# Patient Record
Sex: MALE | Race: OTHER
[De-identification: names, ages, dates, MRNs, and addresses within clinical notes are randomized per-mention and may not be internally consistent; named-entity substitution may affect disease eponyms.]

---

## 2018-08-16 ENCOUNTER — APPOINTMENT (OUTPATIENT)
Dept: FAMILY MEDICINE | Facility: CLINIC | Age: 28
End: 2018-08-16
Payer: COMMERCIAL

## 2018-08-16 VITALS
WEIGHT: 138 LBS | DIASTOLIC BLOOD PRESSURE: 80 MMHG | OXYGEN SATURATION: 97 % | BODY MASS INDEX: 18.69 KG/M2 | HEART RATE: 93 BPM | TEMPERATURE: 98.6 F | SYSTOLIC BLOOD PRESSURE: 110 MMHG | HEIGHT: 72 IN

## 2018-08-16 DIAGNOSIS — R68.89 OTHER GENERAL SYMPTOMS AND SIGNS: ICD-10-CM

## 2018-08-16 DIAGNOSIS — Z00.00 ENCOUNTER FOR GENERAL ADULT MEDICAL EXAMINATION W/OUT ABNORMAL FINDINGS: ICD-10-CM

## 2018-08-16 PROCEDURE — 99203 OFFICE O/P NEW LOW 30 MIN: CPT | Mod: Q5

## 2018-08-16 NOTE — HISTORY OF PRESENT ILLNESS
[FreeTextEntry8] : c/o cough, pain to rt side of trunk, headache, fever, vomiting, no diarrhea,after being out in the rain for an hr this past sunday\par today feels somewhat better\par denies recent travel\par kids are sick

## 2018-08-16 NOTE — PHYSICAL EXAM

## 2018-09-02 ENCOUNTER — APPOINTMENT (OUTPATIENT)
Dept: FAMILY MEDICINE | Facility: CLINIC | Age: 28
End: 2018-09-02
Payer: COMMERCIAL

## 2018-09-02 VITALS
BODY MASS INDEX: 18.69 KG/M2 | SYSTOLIC BLOOD PRESSURE: 108 MMHG | DIASTOLIC BLOOD PRESSURE: 70 MMHG | WEIGHT: 138 LBS | HEIGHT: 72 IN | TEMPERATURE: 98 F

## 2018-09-02 PROCEDURE — 99213 OFFICE O/P EST LOW 20 MIN: CPT

## 2018-09-13 ENCOUNTER — APPOINTMENT (OUTPATIENT)
Dept: FAMILY MEDICINE | Facility: CLINIC | Age: 28
End: 2018-09-13
Payer: COMMERCIAL

## 2018-09-13 VITALS
HEIGHT: 72 IN | WEIGHT: 135 LBS | DIASTOLIC BLOOD PRESSURE: 60 MMHG | BODY MASS INDEX: 18.28 KG/M2 | SYSTOLIC BLOOD PRESSURE: 90 MMHG

## 2018-09-13 DIAGNOSIS — B34.9 VIRAL INFECTION, UNSPECIFIED: ICD-10-CM

## 2018-09-13 PROCEDURE — 36415 COLL VENOUS BLD VENIPUNCTURE: CPT

## 2018-09-13 PROCEDURE — 99213 OFFICE O/P EST LOW 20 MIN: CPT | Mod: 25

## 2018-09-29 ENCOUNTER — TRANSCRIPTION ENCOUNTER (OUTPATIENT)
Age: 28
End: 2018-09-29

## 2018-10-01 ENCOUNTER — APPOINTMENT (OUTPATIENT)
Dept: FAMILY MEDICINE | Facility: CLINIC | Age: 28
End: 2018-10-01
Payer: COMMERCIAL

## 2018-10-01 VITALS
HEART RATE: 95 BPM | DIASTOLIC BLOOD PRESSURE: 74 MMHG | RESPIRATION RATE: 16 BRPM | HEIGHT: 72 IN | OXYGEN SATURATION: 95 % | SYSTOLIC BLOOD PRESSURE: 108 MMHG | WEIGHT: 135 LBS | BODY MASS INDEX: 18.28 KG/M2

## 2018-10-01 DIAGNOSIS — M79.662 PAIN IN LEFT LOWER LEG: ICD-10-CM

## 2018-10-01 PROCEDURE — 99213 OFFICE O/P EST LOW 20 MIN: CPT | Mod: 25

## 2018-10-01 NOTE — ASSESSMENT
[FreeTextEntry1] : reassurance given\par reviewed doppler - negative\par cannot take nsaids and ibuprofen\par rest, ice, elevation\par \par refer to ortho\par possible MRI

## 2018-10-01 NOTE — HISTORY OF PRESENT ILLNESS
[FreeTextEntry8] : 28 year old male here with complaints of left calf pain three days ago.  moved fast and hurt it.  went to urgent care, had doppler which was negative. Patients active medications, allergies and issues were all reviewed with the patient at time of visit.\par

## 2018-10-01 NOTE — PHYSICAL EXAM
[Well Nourished] : well nourished [Normal Oropharynx] : the oropharynx was normal [Supple] : supple [Soft] : abdomen soft [No Rash] : no rash [de-identified] : left calf tenderness - no redness, no erythema

## 2018-10-08 ENCOUNTER — APPOINTMENT (OUTPATIENT)
Dept: GASTROENTEROLOGY | Facility: CLINIC | Age: 28
End: 2018-10-08
Payer: COMMERCIAL

## 2018-10-08 VITALS
DIASTOLIC BLOOD PRESSURE: 72 MMHG | SYSTOLIC BLOOD PRESSURE: 106 MMHG | WEIGHT: 133.8 LBS | HEIGHT: 72 IN | BODY MASS INDEX: 18.12 KG/M2

## 2018-10-08 DIAGNOSIS — E88.09 OTHER DISORDERS OF PLASMA-PROTEIN METABOLISM, NOT ELSEWHERE CLASSIFIED: ICD-10-CM

## 2018-10-08 DIAGNOSIS — D64.9 ANEMIA, UNSPECIFIED: ICD-10-CM

## 2018-10-08 DIAGNOSIS — R68.81 EARLY SATIETY: ICD-10-CM

## 2018-10-08 DIAGNOSIS — R05 COUGH: ICD-10-CM

## 2018-10-08 DIAGNOSIS — K21.9 GASTRO-ESOPHAGEAL REFLUX DISEASE W/OUT ESOPHAGITIS: ICD-10-CM

## 2018-10-08 DIAGNOSIS — Z78.9 OTHER SPECIFIED HEALTH STATUS: ICD-10-CM

## 2018-10-08 DIAGNOSIS — Z80.3 FAMILY HISTORY OF MALIGNANT NEOPLASM OF BREAST: ICD-10-CM

## 2018-10-08 PROCEDURE — 36415 COLL VENOUS BLD VENIPUNCTURE: CPT

## 2018-10-08 PROCEDURE — 82274 ASSAY TEST FOR BLOOD FECAL: CPT | Mod: QW

## 2018-10-08 PROCEDURE — 99205 OFFICE O/P NEW HI 60 MIN: CPT | Mod: 25

## 2018-10-08 RX ORDER — IBUPROFEN 600 MG/1
600 TABLET, FILM COATED ORAL
Qty: 20 | Refills: 0 | Status: DISCONTINUED | COMMUNITY
Start: 2018-09-29 | End: 2018-10-08

## 2018-10-09 ENCOUNTER — FORM ENCOUNTER (OUTPATIENT)
Age: 28
End: 2018-10-09

## 2018-10-09 LAB
ALBUMIN MFR SERPL ELPH: 36.9 %
ALBUMIN SERPL-MCNC: 2.8 G/DL
ALBUMIN/GLOB SERPL: 0.6 RATIO
ALPHA1 GLOB MFR SERPL ELPH: 8.2 %
ALPHA1 GLOB SERPL ELPH-MCNC: 0.6 G/DL
ALPHA2 GLOB MFR SERPL ELPH: 14.6 %
ALPHA2 GLOB SERPL ELPH-MCNC: 1.1 G/DL
AMYLASE/CREAT SERPL: 48 U/L
B-GLOBULIN MFR SERPL ELPH: 12.3 %
B-GLOBULIN SERPL ELPH-MCNC: 0.9 G/DL
BASOPHILS # BLD AUTO: 0.04 K/UL
BASOPHILS NFR BLD AUTO: 0.3 %
CRP SERPL-MCNC: 9.21 MG/DL
DEPRECATED KAPPA LC FREE/LAMBDA SER: 1.39 RATIO
EOSINOPHIL # BLD AUTO: 0.15 K/UL
EOSINOPHIL NFR BLD AUTO: 1.2 %
ERYTHROCYTE [SEDIMENTATION RATE] IN BLOOD BY WESTERGREN METHOD: 53 MM/HR
FERRITIN SERPL-MCNC: 444 NG/ML
GAMMA GLOB FLD ELPH-MCNC: 2.2 G/DL
GAMMA GLOB MFR SERPL ELPH: 28 %
HCT VFR BLD CALC: 32.8 %
HGB BLD-MCNC: 10.1 G/DL
IGA SER QL IEP: 429 MG/DL
IGG SER QL IEP: 2113 MG/DL
IGM SER QL IEP: 123 MG/DL
IMM GRANULOCYTES NFR BLD AUTO: 0.2 %
INTERPRETATION SERPL IEP-IMP: NORMAL
IRON SATN MFR SERPL: 7 %
IRON SERPL-MCNC: 12 UG/DL
KAPPA LC CSF-MCNC: 5.75 MG/DL
KAPPA LC SERPL-MCNC: 8 MG/DL
LPL SERPL-CCNC: 19 U/L
LYMPHOCYTES # BLD AUTO: 2.34 K/UL
LYMPHOCYTES NFR BLD AUTO: 18.3 %
M PROTEIN MFR SERPL ELPH: 4.9 %
M PROTEIN SPEC IFE-MCNC: NORMAL
MAN DIFF?: NORMAL
MCHC RBC-ENTMCNC: 24.5 PG
MCHC RBC-ENTMCNC: 30.8 GM/DL
MCV RBC AUTO: 79.6 FL
MONOCLON BAND OBS SERPL: 0.4 G/DL
MONOCYTES # BLD AUTO: 1.18 K/UL
MONOCYTES NFR BLD AUTO: 9.2 %
NEUTROPHILS # BLD AUTO: 9.08 K/UL
NEUTROPHILS NFR BLD AUTO: 70.8 %
PLATELET # BLD AUTO: 293 K/UL
PROT SERPL-MCNC: 7.7 G/DL
PROT SERPL-MCNC: 7.7 G/DL
RBC # BLD: 4.12 M/UL
RBC # BLD: 4.12 M/UL
RBC # FLD: 13.4 %
RETICS # AUTO: 1 %
RETICS AGGREG/RBC NFR: 42 K/UL
TIBC SERPL-MCNC: 184 UG/DL
UIBC SERPL-MCNC: 172 UG/DL
WBC # FLD AUTO: 12.82 K/UL

## 2018-10-10 ENCOUNTER — OUTPATIENT (OUTPATIENT)
Dept: OUTPATIENT SERVICES | Facility: HOSPITAL | Age: 28
LOS: 1 days | End: 2018-10-10
Payer: COMMERCIAL

## 2018-10-10 ENCOUNTER — INPATIENT (INPATIENT)
Facility: HOSPITAL | Age: 28
LOS: 28 days | Discharge: HOME | End: 2018-11-08
Attending: HOSPITALIST | Admitting: HOSPITALIST
Payer: COMMERCIAL

## 2018-10-10 ENCOUNTER — APPOINTMENT (OUTPATIENT)
Dept: RADIOLOGY | Facility: IMAGING CENTER | Age: 28
End: 2018-10-10
Payer: COMMERCIAL

## 2018-10-10 VITALS
TEMPERATURE: 98 F | SYSTOLIC BLOOD PRESSURE: 120 MMHG | HEART RATE: 86 BPM | OXYGEN SATURATION: 100 % | RESPIRATION RATE: 16 BRPM | DIASTOLIC BLOOD PRESSURE: 67 MMHG

## 2018-10-10 DIAGNOSIS — R05 COUGH: ICD-10-CM

## 2018-10-10 DIAGNOSIS — Z00.00 ENCOUNTER FOR GENERAL ADULT MEDICAL EXAMINATION WITHOUT ABNORMAL FINDINGS: ICD-10-CM

## 2018-10-10 DIAGNOSIS — D64.9 ANEMIA, UNSPECIFIED: ICD-10-CM

## 2018-10-10 DIAGNOSIS — Z00.8 ENCOUNTER FOR OTHER GENERAL EXAMINATION: ICD-10-CM

## 2018-10-10 DIAGNOSIS — E87.1 HYPO-OSMOLALITY AND HYPONATREMIA: ICD-10-CM

## 2018-10-10 DIAGNOSIS — E88.09 OTHER DISORDERS OF PLASMA-PROTEIN METABOLISM, NOT ELSEWHERE CLASSIFIED: ICD-10-CM

## 2018-10-10 DIAGNOSIS — T88.3XXA MALIGNANT HYPERTHERMIA DUE TO ANESTHESIA, INITIAL ENCOUNTER: ICD-10-CM

## 2018-10-10 LAB
ALBUMIN SERPL ELPH-MCNC: 3.1 G/DL — LOW (ref 3.3–5)
ALP SERPL-CCNC: 68 U/L — SIGNIFICANT CHANGE UP (ref 40–120)
ALT FLD-CCNC: 12 U/L — SIGNIFICANT CHANGE UP (ref 4–41)
APTT BLD: 29.3 SEC — SIGNIFICANT CHANGE UP (ref 27.5–37.4)
AST SERPL-CCNC: 27 U/L — SIGNIFICANT CHANGE UP (ref 4–40)
B PERT DNA SPEC QL NAA+PROBE: SIGNIFICANT CHANGE UP
BASOPHILS # BLD AUTO: 0.05 K/UL — SIGNIFICANT CHANGE UP (ref 0–0.2)
BASOPHILS NFR BLD AUTO: 0.4 % — SIGNIFICANT CHANGE UP (ref 0–2)
BILIRUB SERPL-MCNC: 0.4 MG/DL — SIGNIFICANT CHANGE UP (ref 0.2–1.2)
BUN SERPL-MCNC: 14 MG/DL — SIGNIFICANT CHANGE UP (ref 7–23)
C PNEUM DNA SPEC QL NAA+PROBE: NOT DETECTED — SIGNIFICANT CHANGE UP
CALCIUM SERPL-MCNC: 8.1 MG/DL — LOW (ref 8.4–10.5)
CHLORIDE SERPL-SCNC: 97 MMOL/L — LOW (ref 98–107)
CO2 SERPL-SCNC: 21 MMOL/L — LOW (ref 22–31)
CREAT SERPL-MCNC: 1.1 MG/DL — SIGNIFICANT CHANGE UP (ref 0.5–1.3)
EOSINOPHIL # BLD AUTO: 0.1 K/UL — SIGNIFICANT CHANGE UP (ref 0–0.5)
EOSINOPHIL NFR BLD AUTO: 0.8 % — SIGNIFICANT CHANGE UP (ref 0–6)
FLUAV H1 2009 PAND RNA SPEC QL NAA+PROBE: NOT DETECTED — SIGNIFICANT CHANGE UP
FLUAV H1 RNA SPEC QL NAA+PROBE: NOT DETECTED — SIGNIFICANT CHANGE UP
FLUAV H3 RNA SPEC QL NAA+PROBE: NOT DETECTED — SIGNIFICANT CHANGE UP
FLUAV SUBTYP SPEC NAA+PROBE: SIGNIFICANT CHANGE UP
FLUBV RNA SPEC QL NAA+PROBE: NOT DETECTED — SIGNIFICANT CHANGE UP
GLUCOSE SERPL-MCNC: 84 MG/DL — SIGNIFICANT CHANGE UP (ref 70–99)
HADV DNA SPEC QL NAA+PROBE: NOT DETECTED — SIGNIFICANT CHANGE UP
HCOV 229E RNA SPEC QL NAA+PROBE: NOT DETECTED — SIGNIFICANT CHANGE UP
HCOV HKU1 RNA SPEC QL NAA+PROBE: NOT DETECTED — SIGNIFICANT CHANGE UP
HCOV NL63 RNA SPEC QL NAA+PROBE: NOT DETECTED — SIGNIFICANT CHANGE UP
HCOV OC43 RNA SPEC QL NAA+PROBE: NOT DETECTED — SIGNIFICANT CHANGE UP
HCT VFR BLD CALC: 31.3 % — LOW (ref 39–50)
HGB BLD-MCNC: 9.8 G/DL — LOW (ref 13–17)
HIV COMBO RESULT: SIGNIFICANT CHANGE UP
HIV1+2 AB SPEC QL: SIGNIFICANT CHANGE UP
HMPV RNA SPEC QL NAA+PROBE: NOT DETECTED — SIGNIFICANT CHANGE UP
HPIV1 RNA SPEC QL NAA+PROBE: NOT DETECTED — SIGNIFICANT CHANGE UP
HPIV2 RNA SPEC QL NAA+PROBE: NOT DETECTED — SIGNIFICANT CHANGE UP
HPIV3 RNA SPEC QL NAA+PROBE: NOT DETECTED — SIGNIFICANT CHANGE UP
HPIV4 RNA SPEC QL NAA+PROBE: NOT DETECTED — SIGNIFICANT CHANGE UP
IMM GRANULOCYTES # BLD AUTO: 0.06 # — SIGNIFICANT CHANGE UP
IMM GRANULOCYTES NFR BLD AUTO: 0.5 % — SIGNIFICANT CHANGE UP (ref 0–1.5)
INR BLD: 1.38 — HIGH (ref 0.88–1.17)
LYMPHOCYTES # BLD AUTO: 19.1 % — SIGNIFICANT CHANGE UP (ref 13–44)
LYMPHOCYTES # BLD AUTO: 2.38 K/UL — SIGNIFICANT CHANGE UP (ref 1–3.3)
M PNEUMO DNA SPEC QL NAA+PROBE: NOT DETECTED — SIGNIFICANT CHANGE UP
MCHC RBC-ENTMCNC: 24.4 PG — LOW (ref 27–34)
MCHC RBC-ENTMCNC: 31.3 % — LOW (ref 32–36)
MCV RBC AUTO: 78.1 FL — LOW (ref 80–100)
MONOCYTES # BLD AUTO: 1.3 K/UL — HIGH (ref 0–0.9)
MONOCYTES NFR BLD AUTO: 10.5 % — SIGNIFICANT CHANGE UP (ref 2–14)
NEUTROPHILS # BLD AUTO: 8.55 K/UL — HIGH (ref 1.8–7.4)
NEUTROPHILS NFR BLD AUTO: 68.7 % — SIGNIFICANT CHANGE UP (ref 43–77)
NRBC # FLD: 0 — SIGNIFICANT CHANGE UP
PLATELET # BLD AUTO: 265 K/UL — SIGNIFICANT CHANGE UP (ref 150–400)
PMV BLD: 10.8 FL — SIGNIFICANT CHANGE UP (ref 7–13)
POTASSIUM SERPL-MCNC: 5 MMOL/L — SIGNIFICANT CHANGE UP (ref 3.5–5.3)
POTASSIUM SERPL-SCNC: 5 MMOL/L — SIGNIFICANT CHANGE UP (ref 3.5–5.3)
PROT SERPL-MCNC: 7.9 G/DL — SIGNIFICANT CHANGE UP (ref 6–8.3)
PROTHROM AB SERPL-ACNC: 15.4 SEC — HIGH (ref 9.8–13.1)
RBC # BLD: 4.01 M/UL — LOW (ref 4.2–5.8)
RBC # FLD: 12.7 % — SIGNIFICANT CHANGE UP (ref 10.3–14.5)
RSV RNA SPEC QL NAA+PROBE: NOT DETECTED — SIGNIFICANT CHANGE UP
RV+EV RNA SPEC QL NAA+PROBE: NOT DETECTED — SIGNIFICANT CHANGE UP
SODIUM SERPL-SCNC: 133 MMOL/L — LOW (ref 135–145)
WBC # BLD: 12.44 K/UL — HIGH (ref 3.8–10.5)
WBC # FLD AUTO: 12.44 K/UL — HIGH (ref 3.8–10.5)

## 2018-10-10 PROCEDURE — 99223 1ST HOSP IP/OBS HIGH 75: CPT | Mod: GC

## 2018-10-10 PROCEDURE — 71046 X-RAY EXAM CHEST 2 VIEWS: CPT

## 2018-10-10 PROCEDURE — 71046 X-RAY EXAM CHEST 2 VIEWS: CPT | Mod: 26

## 2018-10-10 RX ORDER — AZITHROMYCIN 500 MG/1
500 TABLET, FILM COATED ORAL ONCE
Qty: 0 | Refills: 0 | Status: COMPLETED | OUTPATIENT
Start: 2018-10-10 | End: 2018-10-10

## 2018-10-10 RX ORDER — AZITHROMYCIN 500 MG/1
500 TABLET, FILM COATED ORAL ONCE
Qty: 0 | Refills: 0 | Status: DISCONTINUED | OUTPATIENT
Start: 2018-10-10 | End: 2018-10-10

## 2018-10-10 RX ORDER — PANTOPRAZOLE SODIUM 20 MG/1
40 TABLET, DELAYED RELEASE ORAL
Qty: 0 | Refills: 0 | Status: DISCONTINUED | OUTPATIENT
Start: 2018-10-10 | End: 2018-10-21

## 2018-10-10 RX ORDER — AZITHROMYCIN 500 MG/1
500 TABLET, FILM COATED ORAL EVERY 24 HOURS
Qty: 0 | Refills: 0 | Status: DISCONTINUED | OUTPATIENT
Start: 2018-10-11 | End: 2018-10-12

## 2018-10-10 RX ORDER — CEFTRIAXONE 500 MG/1
1 INJECTION, POWDER, FOR SOLUTION INTRAMUSCULAR; INTRAVENOUS ONCE
Qty: 0 | Refills: 0 | Status: COMPLETED | OUTPATIENT
Start: 2018-10-10 | End: 2018-10-10

## 2018-10-10 RX ORDER — CEFTRIAXONE 500 MG/1
1 INJECTION, POWDER, FOR SOLUTION INTRAMUSCULAR; INTRAVENOUS EVERY 24 HOURS
Qty: 0 | Refills: 0 | Status: DISCONTINUED | OUTPATIENT
Start: 2018-10-11 | End: 2018-10-12

## 2018-10-10 RX ADMIN — CEFTRIAXONE 100 GRAM(S): 500 INJECTION, POWDER, FOR SOLUTION INTRAMUSCULAR; INTRAVENOUS at 23:57

## 2018-10-10 RX ADMIN — CEFTRIAXONE 100 GRAM(S): 500 INJECTION, POWDER, FOR SOLUTION INTRAMUSCULAR; INTRAVENOUS at 14:03

## 2018-10-10 RX ADMIN — AZITHROMYCIN 250 MILLIGRAM(S): 500 TABLET, FILM COATED ORAL at 14:24

## 2018-10-10 NOTE — ED PROVIDER NOTE - OBJECTIVE STATEMENT
28 M presenting due to abnormal chest xray done by GI doctor in the setting of 6  mo dry cough and 2 mo of fever (estimates 1 hour per week, head feels "warm" during that time and oral temp measured at home is 101. Has prior PPD +ve, and got the BCG vaccine as a child in [place] he believes. Immig[place] to [place] at 12 y/o. The 2 mo of coughing occasionally lead him to vomit, never has been bloody. Is a non smoker. No other PMHx other than he started taking omeprazole recently for the coughing which he was told might be due to reflux.  Denies hemoptysis, n/v/d, skin changes, HA, vision changes, recent int'l travel, sick contacts, ever being incarcerated or homeless, no IVDU

## 2018-10-10 NOTE — ED ADULT NURSE NOTE - CHPI ED NUR SYMPTOMS NEG
no shortness of breath/no chest pain/no chills/no diaphoresis/no edema/no fever/no headache/no body aches/no hemoptysis/no wheezing

## 2018-10-10 NOTE — ED ADULT TRIAGE NOTE - CHIEF COMPLAINT QUOTE
arrives with N95 mask in place A&Ox3 arrives from [address] [place] [address] was sent by imaging office chest x-ray showed possible TB  pt reports cough for past six months with fever chills denies cp sob n/v/d, neck stiffness, respirations equal and non labored no respiratory distress noted

## 2018-10-10 NOTE — H&P ADULT - ASSESSMENT
28 year old Turkish male with PMHx of GERD presenting to ED after X-ray performed by GI for chronic cough demonstrated multifocal consolidations, with admission for rule out of TB.

## 2018-10-10 NOTE — ED PROVIDER NOTE - ATTENDING CONTRIBUTION TO CARE
28M sent to ED after outpatient CXR shows signs of TB. Patient with positive PPD in past. Endorses fever, cough, chills over past several months. Patient denies headache, vision changes, focal weakness/numbness, neck pain, chest pain, back pain, abd pain, nausea, vomiting, diarrhea, constipation, blood in stool, dysuria, rash, trauma, falls. Patient is well appearing, conversant, cooperative, smiling, head atraumatic, neck supple with full range of motion, oropharynx clear, lungs clear, no crackles or rales, speaking full sentences, heart clear, no murmurs, distal pulses equal in all 4 extremities, abdomen soft nontender nondistended with no masses, no leg edema, no calf tenderness, nonfocal neurologic exam.    Patient stable. No signs of sepsis or shock. Antibiotics for CAP. TB labs. Respiratory isolation. Cultures. Admit.

## 2018-10-10 NOTE — H&P ADULT - PROBLEM SELECTOR PLAN 2
-Microcytic anemia  -Hgb 9.8 (unclear of baseline)  -no active signs of bleeding; as per patient, occasional small amount of BRB in stool (FOBT as outpatient was negative, never had colonoscopy)    *will send iron studies  *will send for hemolysis labs (LDH, haptoglobin)  *monitor Hgb and for signs of bleeding

## 2018-10-10 NOTE — ED ADULT NURSE NOTE - CHIEF COMPLAINT QUOTE
arrives with N95 mask in place A&Ox3 arrives from [address] [place] [address] was sent by imaging office chest x-ray showed possible TB  pt reports cough for past six months with fever chills denies cp sob n/v/d, neck stiffness, respirations equal and non labored no respiratory distress noted

## 2018-10-10 NOTE — H&P ADULT - NSHPSOCIALHISTORY_GEN_ALL_CORE
Lives at home with significant other, mother, 2 children (ages 2,3), sister  Denies tobacco or illicit drug use, occasional EtOH use

## 2018-10-10 NOTE — H&P ADULT - ATTENDING COMMENTS
Pt seen and examined with HS on above date.  Agree with above HS note.  Plan discussed with House staff.    28 M with ? GERD sent from GI with multifocal pneumonia (CXR read by me).  some concern for TB, seems less likely.  Will cover for CAP, chest CT. Check iron studies given microcytic anemia and poor nutritional status. coagulopathy with unclear etiology, possible nutritional deficiency too. Pt seen and examined at bedside on 10/10 at 6 pm.  Pt seen and examined with HS.  Agree with above HS note.  Plan discussed with House staff.    28 M with ? GERD sent from GI with multifocal pneumonia (CXR read by me).  some concern for TB, seems less likely.  Will cover for CAP, chest CT. Check iron studies given microcytic anemia and poor nutritional status. coagulopathy with unclear etiology, possible nutritional deficiency too.

## 2018-10-10 NOTE — ED ADULT NURSE NOTE - OBJECTIVE STATEMENT
Patient reports flu like symptoms, saw PMD and had XR performed. Patient states that MD was concerned for TB d/t + PPD tests in the past. Patient reports cough, intermittent fevers, denies any CP or difficulty breathing or hemoptysis. Patient appears NAD, VSS. Patient is able to converse without difficulty. Patient pending further evaluation by MD. Patient reports flu like symptoms, saw GI specialist and had XR performed. Patient states that MD was concerned for TB d/t + PPD tests in the past. Patient went to PMD d/t recurring heartburn, unrelieved by medication. Patient reports cough, intermittent fevers, denies any CP or difficulty breathing or hemoptysis. Patient denies any recent weight loss, hx of HIV/drug use, incarceration or being homeless. Patient appears NAD, VSS. Patient is able to converse without difficulty. Patient currently refusing to have rectal temperature taken. Patient pending further evaluation by MD.

## 2018-10-10 NOTE — H&P ADULT - NSHPLABSRESULTS_GEN_ALL_CORE
.  LABS:                         9.8    12.44 )-----------( 265      ( 10 Oct 2018 13:40 )             31.3     10-10    133<L>  |  97<L>  |  14  ----------------------------<  84  5.0   |  21<L>  |  1.10    Ca    8.1<L>      10 Oct 2018 13:40    TPro  7.9  /  Alb  3.1<L>  /  TBili  0.4  /  DBili  x   /  AST  27  /  ALT  12  /  AlkPhos  68  10-10    PT/INR - ( 10 Oct 2018 13:40 )   PT: 15.4 SEC;   INR: 1.38          PTT - ( 10 Oct 2018 13:40 )  PTT:29.3 SEC          RADIOLOGY, EKG & ADDITIONAL TESTS: Reviewed.     < from: Xray Chest 2 Views PA/Lat (10.10.18 @ 11:42) >    INTERPRETATION:  History: Fever, night sweats and cough times several   months    PA and lateral chest    Heart size is normal    Multifocal consolidations are noted, including in the right lung apex.    No acute bony abnormality      Impression:    Multifocal consolidations, likely infectious. Given history, tuberculosis   should be considered. The patient  was sent to the emergency room for further evaluation.    The findings were discussed with Dr. Miranda at time of final signing.       < end of copied text >

## 2018-10-10 NOTE — H&P ADULT - PROBLEM SELECTOR PLAN 1
-6 months of dry cough, with findings of multifocal consolidations on CXR  -followed by GI outpatient for GERD causing these symptoms (improvement of cough with omeprazole and with eating)   -now CXR with multiple consolidations and possible appearance of interstitial lung disease  Differential: GERD vs. TB vs. pneumonia vs. ILD 2/2 to rheumatologic condition?     *will send for CT of the chest  *start ceftriaxone and azithromycin   *Blood and urine cultures sent  *Quantiferon gold sent  *will obtain 3 sputum samples (if no sputum production, can try hypertonic saline)   *Legionella sent   *will continue with pantoprazole in the hospital -6 months of dry cough, with findings of multifocal consolidations on CXR  -followed by GI outpatient for GERD causing these symptoms (improvement of cough with omeprazole and with eating)   -now CXR with multiple consolidations and possible appearance of interstitial lung disease  Differential: GERD vs. TB vs. pneumonia vs. ILD 2/2 to rheumatologic condition?     *will send for CT of the chest  *start ceftriaxone and azithromycin   *Quantiferon gold sent  *will obtain 3 sputum samples (if no sputum production, can try hypertonic saline)   *Blood and urine cultures and Legionella sent   *will continue with pantoprazole in the hospital

## 2018-10-10 NOTE — H&P ADULT - NSHPPHYSICALEXAM_GEN_ALL_CORE
Vital Signs Last 24 Hrs  T(C): 38 (10 Oct 2018 15:53), Max: 38 (10 Oct 2018 15:53)  T(F): 100.4 (10 Oct 2018 15:53), Max: 100.4 (10 Oct 2018 15:53)  HR: 96 (10 Oct 2018 15:53) (86 - 96)  BP: 136/76 (10 Oct 2018 15:53) (120/67 - 136/76)  BP(mean): --  RR: 18 (10 Oct 2018 15:53) (16 - 18)  SpO2: 100% (10 Oct 2018 15:53) (100% - 100%)        PHYSICAL EXAM:  GENERAL: No acute distress, well-developed  HEAD:  Atraumatic, Normocephalic  EYES: EOMI, PERRLA, conjunctiva and sclera clear  NECK: Supple, no lymphadenopathy, no JVD  CHEST/LUNG: CTABL; No wheezes, rales, or rhonchi  HEART: Regular rate and rhythm; No murmurs, rubs, or gallops  ABDOMEN: Soft, non-tender, non-distended; normal bowel sounds, no organomegaly  EXTREMITIES:  2+ peripheral pulses b/l, No clubbing, cyanosis, or edema  NEUROLOGY: A&O x 3, no focal deficits  SKIN: No rashes or lesions Vital Signs Last 24 Hrs  T(C): 38 (10 Oct 2018 15:53), Max: 38 (10 Oct 2018 15:53)  T(F): 100.4 (10 Oct 2018 15:53), Max: 100.4 (10 Oct 2018 15:53)  HR: 96 (10 Oct 2018 15:53) (86 - 96)  BP: 136/76 (10 Oct 2018 15:53) (120/67 - 136/76)  BP(mean): --  RR: 18 (10 Oct 2018 15:53) (16 - 18)  SpO2: 100% (10 Oct 2018 15:53) (100% - 100%)        PHYSICAL EXAM:  GENERAL: No acute distress, thin  HEAD:  Atraumatic, Normocephalic  EYES: EOMI, PERRLA, conjunctiva and sclera clear  NECK: Supple, no lymphadenopathy, no JVD  CHEST/LUNG: CTABL; No wheezes, rales, or rhonchi  HEART: Regular rate and rhythm; No murmurs, rubs, or gallops  ABDOMEN: Soft, non-tender, non-distended; normal bowel sounds, no organomegaly  EXTREMITIES:  2+ peripheral pulses b/l, No clubbing, cyanosis, or edema  NEUROLOGY: A&O x 3, no focal deficits  SKIN: No rashes or lesions

## 2018-10-10 NOTE — ED PROVIDER NOTE - PHYSICAL EXAMINATION
General: Alert and Oriented. No apparent distress.  Head: Normocephalic and atraumatic.  Eyes: PERRLA with EOMI.  Neck: Supple. Trachea midline.   Cardiac: Normal S1 and S2 w/ RRR. No murmurs appreciated.   Pulmonary: Vesicular breath sounds bilaterally. No increased WOB. No wheezes or crackles.  Abdominal: Soft, non-tender. Normoactive bowel sounds.  Neurologic: No focal sensory or motor deficits.  Musculoskeletal: Strength appropriate in all 4 extremities for age with no limited ROM.  Skin: Color appropriate for race. Intact, warm, and well-perfused.  Psychiatric: Appropriate mood and affect. No apparent risk to self or others.

## 2018-10-10 NOTE — ED PROVIDER NOTE - MEDICAL DECISION MAKING DETAILS
28 M sent in due to abnormal CXR and months of fever concern for TB. On exam no focal findings. Given sx, and CXR which has already been done, concern for PNA vs TB. Plan: labs, cxr done, abx

## 2018-10-10 NOTE — H&P ADULT - NSHPREVIEWOFSYSTEMS_GEN_ALL_CORE
REVIEW OF SYSTEMS:    CONSTITUTIONAL: No weakness, fevers or chills  EYES/ENT: No visual changes;  No vertigo or throat pain   NECK: No pain or stiffness  RESPIRATORY: (+) dry cough that becomes severe enough at times to cause emesis; denies wheezing, hemoptysis; No shortness of breath  CARDIOVASCULAR: chest pain only with cough; no palpitations;  GASTROINTESTINAL: No abdominal or epigastric pain. No nausea, vomiting, or hematemesis; No diarrhea or constipation. (+) occasional small amount of BRB in stool  GENITOURINARY: No dysuria, frequency or hematuria  NEUROLOGICAL: No numbness or weakness  SKIN: No itching, rashes

## 2018-10-10 NOTE — H&P ADULT - PROBLEM SELECTOR PLAN 3
-unclear etiology for hypoalbumenia  -patient does eat well  -INR was also elevated, with microcytic anemia (possible nutritional deficiency)    *repeat coagulation studies in the AM  *will follow

## 2018-10-10 NOTE — H&P ADULT - HISTORY OF PRESENT ILLNESS
28 year old male with PMHx of GERD on omeprazole presenting due to abnormal chest xray done by GI doctor in the setting of 6 months of dry cough. He states that the cough is dry and not productive of sputum, but he does vomit after excessive coughing. The patient states that the coughing is worse upon waking and is hungry, and improves with omeprazole and after eating. Upon further questioning, he denies any fevers, SOB, CP, hemoptysis, nausea, diarrhea, constipation, night sweats. He does state that he has very little blood in his stool at times, but FOBT as an outpatient was negative. He has a prior positive PPD but he did have a BCG vaccination in [facility], where he Immigrated from when he was 12. He denies recent travel back to [facility] (last time 5 years ago) or any other travel.    In the ED, his vital signs were wnl. The CXR demonstrated multifocal consolidations concerning for infection. His laboratory values were significant for increased WBC, and anemia (9.8 with baseline unclear). He was also slightly hyponatremic. In the ED he was given azithromycin and ceftriaxone. Blood and urine cultures were sent, with Quantiferon Gold, and Legionella. Obtaining sputum cultures was also attempted. 28 year old male with PMHx of GERD on omeprazole presenting due to abnormal chest xray done by GI doctor in the setting of 6 months of dry cough. He states that the cough is dry and not productive of sputum, but he does vomit after excessive coughing. The patient states that the coughing is worse upon waking and is hungry, and improves with omeprazole and after eating. Upon further questioning, he endorses approximately 5 fevers over the past few months.  Denies SOB, CP, hemoptysis, nausea, diarrhea, constipation, night sweats. He does state that he has very little blood in his stool at times, but FOBT as an outpatient was negative. He has a prior positive PPD but he did have a BCG vaccination in [facility], where he Immigrated from when he was 12. He denies recent travel back to [facility] (last time 5 years ago) or any other travel.    In the ED, his vital signs were wnl. The CXR demonstrated multifocal consolidations concerning for infection. His laboratory values were significant for increased WBC, and anemia (9.8 with baseline unclear). He was also slightly hyponatremic. In the ED he was given azithromycin and ceftriaxone. Blood and urine cultures were sent, with Quantiferon Gold, and Legionella. Obtaining sputum cultures was also attempted.

## 2018-10-10 NOTE — ED ADULT NURSE NOTE - NSIMPLEMENTINTERV_GEN_ALL_ED
Implemented All Universal Safety Interventions:  [place] to call system. Call bell, personal items and telephone within reach. Instruct patient to call for assistance. Room bathroom lighting operational. Non-slip footwear when patient is off stretcher. Physically safe environment: no spills, clutter or unnecessary equipment. Stretcher in lowest position, wheels locked, appropriate side rails in place.

## 2018-10-11 DIAGNOSIS — J18.9 PNEUMONIA, UNSPECIFIED ORGANISM: ICD-10-CM

## 2018-10-11 LAB
ALBUMIN SERPL ELPH-MCNC: 2.7 G/DL — LOW (ref 3.3–5)
ALP SERPL-CCNC: 57 U/L — SIGNIFICANT CHANGE UP (ref 40–120)
ALT FLD-CCNC: 11 U/L — SIGNIFICANT CHANGE UP (ref 4–41)
APTT BLD: 30.1 SEC — SIGNIFICANT CHANGE UP (ref 27.5–37.4)
AST SERPL-CCNC: 15 U/L — SIGNIFICANT CHANGE UP (ref 4–40)
BASOPHILS # BLD AUTO: 0.06 K/UL — SIGNIFICANT CHANGE UP (ref 0–0.2)
BASOPHILS NFR BLD AUTO: 0.5 % — SIGNIFICANT CHANGE UP (ref 0–2)
BILIRUB SERPL-MCNC: 0.3 MG/DL — SIGNIFICANT CHANGE UP (ref 0.2–1.2)
BUN SERPL-MCNC: 16 MG/DL — SIGNIFICANT CHANGE UP (ref 7–23)
CALCIUM SERPL-MCNC: 8.5 MG/DL — SIGNIFICANT CHANGE UP (ref 8.4–10.5)
CHLORIDE SERPL-SCNC: 98 MMOL/L — SIGNIFICANT CHANGE UP (ref 98–107)
CO2 SERPL-SCNC: 23 MMOL/L — SIGNIFICANT CHANGE UP (ref 22–31)
CREAT SERPL-MCNC: 1.08 MG/DL — SIGNIFICANT CHANGE UP (ref 0.5–1.3)
CULTURE - ACID FAST SMEAR CONCENTRATED: SIGNIFICANT CHANGE UP
EOSINOPHIL # BLD AUTO: 0.23 K/UL — SIGNIFICANT CHANGE UP (ref 0–0.5)
EOSINOPHIL NFR BLD AUTO: 1.9 % — SIGNIFICANT CHANGE UP (ref 0–6)
FERRITIN SERPL-MCNC: 400.5 NG/ML — HIGH (ref 30–400)
GLUCOSE SERPL-MCNC: 90 MG/DL — SIGNIFICANT CHANGE UP (ref 70–99)
HAPTOGLOB SERPL-MCNC: 420 MG/DL — HIGH (ref 34–200)
HCT VFR BLD CALC: 29.9 % — LOW (ref 39–50)
HGB BLD-MCNC: 9.4 G/DL — LOW (ref 13–17)
IMM GRANULOCYTES # BLD AUTO: 0.05 # — SIGNIFICANT CHANGE UP
IMM GRANULOCYTES NFR BLD AUTO: 0.4 % — SIGNIFICANT CHANGE UP (ref 0–1.5)
INR BLD: 1.46 — HIGH (ref 0.88–1.17)
IRON SATN MFR SERPL: 138 UG/DL — LOW (ref 155–535)
IRON SATN MFR SERPL: 16 UG/DL — LOW (ref 45–165)
L PNEUMO AG UR QL: NEGATIVE — SIGNIFICANT CHANGE UP
LDH SERPL L TO P-CCNC: 195 U/L — SIGNIFICANT CHANGE UP (ref 135–225)
LYMPHOCYTES # BLD AUTO: 25 % — SIGNIFICANT CHANGE UP (ref 13–44)
LYMPHOCYTES # BLD AUTO: 3.01 K/UL — SIGNIFICANT CHANGE UP (ref 1–3.3)
MAGNESIUM SERPL-MCNC: 2.2 MG/DL — SIGNIFICANT CHANGE UP (ref 1.6–2.6)
MCHC RBC-ENTMCNC: 24.7 PG — LOW (ref 27–34)
MCHC RBC-ENTMCNC: 31.4 % — LOW (ref 32–36)
MCV RBC AUTO: 78.7 FL — LOW (ref 80–100)
MONOCYTES # BLD AUTO: 1.32 K/UL — HIGH (ref 0–0.9)
MONOCYTES NFR BLD AUTO: 11 % — SIGNIFICANT CHANGE UP (ref 2–14)
NEUTROPHILS # BLD AUTO: 7.38 K/UL — SIGNIFICANT CHANGE UP (ref 1.8–7.4)
NEUTROPHILS NFR BLD AUTO: 61.2 % — SIGNIFICANT CHANGE UP (ref 43–77)
NRBC # FLD: 0 — SIGNIFICANT CHANGE UP
PHOSPHATE SERPL-MCNC: 4.7 MG/DL — HIGH (ref 2.5–4.5)
PLATELET # BLD AUTO: 287 K/UL — SIGNIFICANT CHANGE UP (ref 150–400)
PMV BLD: 11.7 FL — SIGNIFICANT CHANGE UP (ref 7–13)
POTASSIUM SERPL-MCNC: 4.9 MMOL/L — SIGNIFICANT CHANGE UP (ref 3.5–5.3)
POTASSIUM SERPL-SCNC: 4.9 MMOL/L — SIGNIFICANT CHANGE UP (ref 3.5–5.3)
PROT SERPL-MCNC: 6.9 G/DL — SIGNIFICANT CHANGE UP (ref 6–8.3)
PROTHROM AB SERPL-ACNC: 16.3 SEC — HIGH (ref 9.8–13.1)
RBC # BLD: 3.8 M/UL — LOW (ref 4.2–5.8)
RBC # FLD: 12.8 % — SIGNIFICANT CHANGE UP (ref 10.3–14.5)
SODIUM SERPL-SCNC: 134 MMOL/L — LOW (ref 135–145)
SPECIMEN SOURCE: SIGNIFICANT CHANGE UP
TRANSFERRIN SERPL-MCNC: 98 MG/DL — LOW (ref 200–360)
UIBC SERPL-MCNC: 122.1 UG/DL — SIGNIFICANT CHANGE UP (ref 110–370)
WBC # BLD: 12.05 K/UL — HIGH (ref 3.8–10.5)
WBC # FLD AUTO: 12.05 K/UL — HIGH (ref 3.8–10.5)

## 2018-10-11 PROCEDURE — 71250 CT THORAX DX C-: CPT | Mod: 26

## 2018-10-11 PROCEDURE — 99232 SBSQ HOSP IP/OBS MODERATE 35: CPT | Mod: GC

## 2018-10-11 RX ORDER — ACETAMINOPHEN 500 MG
650 TABLET ORAL EVERY 6 HOURS
Qty: 0 | Refills: 0 | Status: DISCONTINUED | OUTPATIENT
Start: 2018-10-11 | End: 2018-10-11

## 2018-10-11 RX ORDER — SODIUM CHLORIDE 9 MG/ML
3 INJECTION INTRAMUSCULAR; INTRAVENOUS; SUBCUTANEOUS EVERY 8 HOURS
Qty: 0 | Refills: 0 | Status: DISCONTINUED | OUTPATIENT
Start: 2018-10-11 | End: 2018-10-12

## 2018-10-11 RX ORDER — PHYTONADIONE (VIT K1) 5 MG
5 TABLET ORAL ONCE
Qty: 0 | Refills: 0 | Status: COMPLETED | OUTPATIENT
Start: 2018-10-11 | End: 2018-10-11

## 2018-10-11 RX ORDER — SODIUM CHLORIDE 9 MG/ML
3 INJECTION INTRAMUSCULAR; INTRAVENOUS; SUBCUTANEOUS EVERY 8 HOURS
Qty: 0 | Refills: 0 | Status: DISCONTINUED | OUTPATIENT
Start: 2018-10-11 | End: 2018-10-11

## 2018-10-11 RX ADMIN — PANTOPRAZOLE SODIUM 40 MILLIGRAM(S): 20 TABLET, DELAYED RELEASE ORAL at 18:39

## 2018-10-11 RX ADMIN — Medication 5 MILLIGRAM(S): at 10:01

## 2018-10-11 RX ADMIN — AZITHROMYCIN 250 MILLIGRAM(S): 500 TABLET, FILM COATED ORAL at 00:34

## 2018-10-11 RX ADMIN — SODIUM CHLORIDE 3 MILLILITER(S): 9 INJECTION INTRAMUSCULAR; INTRAVENOUS; SUBCUTANEOUS at 23:20

## 2018-10-11 RX ADMIN — CEFTRIAXONE 100 GRAM(S): 500 INJECTION, POWDER, FOR SOLUTION INTRAMUSCULAR; INTRAVENOUS at 23:34

## 2018-10-11 RX ADMIN — PANTOPRAZOLE SODIUM 40 MILLIGRAM(S): 20 TABLET, DELAYED RELEASE ORAL at 06:42

## 2018-10-11 NOTE — PROGRESS NOTE ADULT - ATTENDING COMMENTS
Pt seen and examined with HS on above date.  Agree with above HS note.  Plan discussed with House staff.    28 M with multifocal pna, fevers, anemia (appears to be ACD) and malnutrition.  CT pending, r/o TB.  if spikes fever again will broaden abx to include MRSA coverage

## 2018-10-11 NOTE — PROGRESS NOTE ADULT - SUBJECTIVE AND OBJECTIVE BOX
Tabatha Bowman, PGY1   Contact/Pager - [phone] / [phone]      Patient is a 28y old  Male who presents with a chief complaint of long term cough and concerning CXR findings, admitted for TB rule out (10 Oct 2018 16:37)        SUBJECTIVE / OVERNIGHT EVENTS:  febrile overnight to 101.8, broke on its own without antipyretics (allergic to tylenol and NSAIDs)        MEDICATIONS  (STANDING):  azithromycin  IVPB 500 milliGRAM(s) IV Intermittent every 24 hours  cefTRIAXone   IVPB 1 Gram(s) IV Intermittent every 24 hours  pantoprazole    Tablet 40 milliGRAM(s) Oral two times a day    MEDICATIONS  (PRN):      Allergies    Advil (Swelling)  Motrin (Swelling)  Tylenol (Swelling)    Intolerances          Vital Signs Last 24 Hrs  T(C): 37.3 (11 Oct 2018 06:39), Max: 38.8 (10 Oct 2018 19:15)  T(F): 99.1 (11 Oct 2018 06:39), Max: 101.8 (10 Oct 2018 19:15)  HR: 83 (11 Oct 2018 06:39) (83 - 100)  BP: 117/68 (11 Oct 2018 06:39) (112/68 - 136/76)  BP(mean): --  RR: 18 (11 Oct 2018 06:39) (16 - 18)  SpO2: 99% (11 Oct 2018 06:39) (99% - 100%)  CAPILLARY BLOOD GLUCOSE        I&O's Summary        PHYSICAL EXAM:  GENERAL: NAD, well-developed  HEAD:  AT, NC  EYES: EOMI, PERRLA, conjunctiva and sclera clear  ENMT: Airway patent. MMM. Good dentition, no lesions.  NECK: Supple, No JVD  CHEST/LUNG: CTABL; No wheezing, rhonci, or rales  HEART: RRR; Normal S1, S2. No murmurs, rubs, or gallops  ABDOMEN: Soft, NT, ND; Bowel sounds present. No organomegaly  EXTREMITIES:  2+ Peripheral Pulses, No clubbing, cyanosis, or edema  PSYCH: AAOx3  NEUROLOGY: non-focal  SKIN: Warm, dry, intact; No rashes or lesions      LABS:                        9.4    12.05 )-----------( 287      ( 11 Oct 2018 05:05 )             29.9     10-11    134<L>  |  98  |  16  ----------------------------<  90  4.9   |  23  |  1.08    Ca    8.5      11 Oct 2018 05:05  Phos  4.7     10-11  Mg     2.2     10-11    TPro  6.9  /  Alb  2.7<L>  /  TBili  0.3  /  DBili  x   /  AST  15  /  ALT  11  /  AlkPhos  57  10-11    LIVER FUNCTIONS - ( 11 Oct 2018 05:05 )  Alb: 2.7 g/dL / Pro: 6.9 g/dL / ALK PHOS: 57 u/L / ALT: 11 u/L / AST: 15 u/L / GGT: x           PT/INR - ( 11 Oct 2018 05:05 )   PT: 16.3 SEC;   INR: 1.46          PTT - ( 11 Oct 2018 05:05 )  PTT:30.1 SEC                RADIOLOGY & ADDITIONAL TESTS:    Imaging Personally Reviewed: YES    Consultant(s) Notes Reviewed: YES    Care Discussed with Consultants/Other Providers: YES Tabatha Bowman, PGY1   Contact/Pager - [phone] / [phone]      Patient is a 28y old  Male who presents with a chief complaint of long term cough and concerning CXR findings, admitted for TB rule out (10 Oct 2018 16:37)        SUBJECTIVE / OVERNIGHT EVENTS:  febrile overnight to 101.8, broke on its own without antipyretics (allergic to tylenol and NSAIDs)  otherwise, he states he does have some cough, but feels slightly better overall        MEDICATIONS  (STANDING):  azithromycin  IVPB 500 milliGRAM(s) IV Intermittent every 24 hours  cefTRIAXone   IVPB 1 Gram(s) IV Intermittent every 24 hours  pantoprazole    Tablet 40 milliGRAM(s) Oral two times a day    MEDICATIONS  (PRN):      Allergies    Advil (Swelling)  Motrin (Swelling)  Tylenol (Swelling)    Intolerances          Vital Signs Last 24 Hrs  T(C): 37.3 (11 Oct 2018 06:39), Max: 38.8 (10 Oct 2018 19:15)  T(F): 99.1 (11 Oct 2018 06:39), Max: 101.8 (10 Oct 2018 19:15)  HR: 83 (11 Oct 2018 06:39) (83 - 100)  BP: 117/68 (11 Oct 2018 06:39) (112/68 - 136/76)  BP(mean): --  RR: 18 (11 Oct 2018 06:39) (16 - 18)  SpO2: 99% (11 Oct 2018 06:39) (99% - 100%)  CAPILLARY BLOOD GLUCOSE        I&O's Summary        PHYSICAL EXAM:  GENERAL: NAD, well-developed  HEAD:  AT, NC  EYES: EOMI, PERRLA, conjunctiva and sclera clear  ENMT: Airway patent. MMM. Good dentition, no lesions.  NECK: Supple, No JVD  CHEST/LUNG: CTABL; No wheezing, rhonci, or rales  HEART: RRR; Normal S1, S2. No murmurs, rubs, or gallops  ABDOMEN: Soft, NT, ND; Bowel sounds present. No organomegaly  EXTREMITIES:  2+ Peripheral Pulses, No clubbing, cyanosis, or edema  PSYCH: AAOx3  NEUROLOGY: non-focal  SKIN: Warm, dry, intact; No rashes or lesions      LABS:                        9.4    12.05 )-----------( 287      ( 11 Oct 2018 05:05 )             29.9     10-11    134<L>  |  98  |  16  ----------------------------<  90  4.9   |  23  |  1.08    Ca    8.5      11 Oct 2018 05:05  Phos  4.7     10-11  Mg     2.2     10-11    TPro  6.9  /  Alb  2.7<L>  /  TBili  0.3  /  DBili  x   /  AST  15  /  ALT  11  /  AlkPhos  57  10-11    LIVER FUNCTIONS - ( 11 Oct 2018 05:05 )  Alb: 2.7 g/dL / Pro: 6.9 g/dL / ALK PHOS: 57 u/L / ALT: 11 u/L / AST: 15 u/L / GGT: x           PT/INR - ( 11 Oct 2018 05:05 )   PT: 16.3 SEC;   INR: 1.46          PTT - ( 11 Oct 2018 05:05 )  PTT:30.1 SEC                RADIOLOGY & ADDITIONAL TESTS:    Imaging Personally Reviewed: YES    Consultant(s) Notes Reviewed: YES    Care Discussed with Consultants/Other Providers: YES Tabatha Bowman, PGY1   Contact/Pager - [phone] / [phone]      Patient is a 28y old  Male who presents with a chief complaint of long term cough and concerning CXR findings, admitted for TB rule out (10 Oct 2018 16:37)        SUBJECTIVE / OVERNIGHT EVENTS:  febrile overnight to 101.8, broke on its own without antipyretics (allergic to tylenol and NSAIDs)  otherwise, he states he does have some cough, but feels slightly better overall        MEDICATIONS  (STANDING):  azithromycin  IVPB 500 milliGRAM(s) IV Intermittent every 24 hours  cefTRIAXone   IVPB 1 Gram(s) IV Intermittent every 24 hours  pantoprazole    Tablet 40 milliGRAM(s) Oral two times a day    MEDICATIONS  (PRN):      Allergies    Advil (Swelling)  Motrin (Swelling)  Tylenol (Swelling)    Intolerances          Vital Signs Last 24 Hrs  T(C): 37.3 (11 Oct 2018 06:39), Max: 38.8 (10 Oct 2018 19:15)  T(F): 99.1 (11 Oct 2018 06:39), Max: 101.8 (10 Oct 2018 19:15)  HR: 83 (11 Oct 2018 06:39) (83 - 100)  BP: 117/68 (11 Oct 2018 06:39) (112/68 - 136/76)  BP(mean): --  RR: 18 (11 Oct 2018 06:39) (16 - 18)  SpO2: 99% (11 Oct 2018 06:39) (99% - 100%)  CAPILLARY BLOOD GLUCOSE        I&O's Summary        PHYSICAL EXAM:  GENERAL: NAD, well-developed  HEAD:  AT, NC  EYES: EOMI, PERRLA, conjunctiva and sclera clear  ENMT: Airway patent. MMM. Good dentition, no lesions.  NECK: Supple, No JVD  CHEST/LUNG: CTABL; No wheezing, rhonci, or rales  HEART: RRR; Normal S1, S2. No murmurs, rubs, or gallops  ABDOMEN: Soft, NT, ND; Bowel sounds present. No organomegaly  EXTREMITIES:  2+ Peripheral Pulses, No clubbing, cyanosis, or edema  PSYCH: AAOx3  NEUROLOGY: non-focal  SKIN: Warm, dry, intact; No rashes or lesions      LABS:                        9.4    12.05 )-----------( 287      ( 11 Oct 2018 05:05 )             29.9     10-11    134<L>  |  98  |  16  ----------------------------<  90  4.9   |  23  |  1.08    Ca    8.5      11 Oct 2018 05:05  Phos  4.7     10-11  Mg     2.2     10-11    TPro  6.9  /  Alb  2.7<L>  /  TBili  0.3  /  DBili  x   /  AST  15  /  ALT  11  /  AlkPhos  57  10-11    LIVER FUNCTIONS - ( 11 Oct 2018 05:05 )  Alb: 2.7 g/dL / Pro: 6.9 g/dL / ALK PHOS: 57 u/L / ALT: 11 u/L / AST: 15 u/L / GGT: x           PT/INR - ( 11 Oct 2018 05:05 )   PT: 16.3 SEC;   INR: 1.46          PTT - ( 11 Oct 2018 05:05 )  PTT:30.1 SEC        Sput[employer] neg x 1        RADIOLOGY & ADDITIONAL TESTS:    Imaging Personally Reviewed: YES      Care Discussed with Consultants/Other Providers: YES

## 2018-10-11 NOTE — PROGRESS NOTE ADULT - PROBLEM SELECTOR PLAN 1
-6 months of dry cough, with findings of multifocal consolidations on CXR  -followed by GI outpatient for GERD causing these symptoms (improvement of cough with omeprazole and with eating)   -now CXR with multiple consolidations and possible appearance of interstitial lung disease  Differential: GERD vs. TB vs. pneumonia vs. ILD 2/2 to rheumatologic condition?     *will send for CT of the chest  *start ceftriaxone and azithromycin   *Quantiferon gold sent  *will obtain 3 sputum samples (if no sputum production, can try hypertonic saline)   *Blood and urine cultures and Legionella sent   *will continue with pantoprazole in the hospital -6 months of dry cough, with findings of multifocal consolidations on CXR  -followed by GI outpatient for GERD causing these symptoms (improvement of cough with omeprazole and with eating)   -now CXR with multiple consolidations and possible appearance of interstitial lung disease  Differential: GERD vs. TB vs. pneumonia vs. ILD 2/2 to rheumatologic condition?   -now febrile overnight (GERD less likely)    *f/u CT of the chest  *continue ceftriaxone and azithromycin   *f/u Quantiferon gold  *will obtain 3 sputum samples (attempt hypertonic saline for sample)   *Blood and urine cultures and Legionella sent   *will continue with pantoprazole in the hospital

## 2018-10-11 NOTE — PROGRESS NOTE ADULT - PROBLEM SELECTOR PLAN 2
-Microcytic anemia  -Hgb 9.8 (unclear of baseline)  -no active signs of bleeding; as per patient, occasional small amount of BRB in stool (FOBT as outpatient was negative, never had colonoscopy)    *will send iron studies  *will send for hemolysis labs (LDH, haptoglobin)  *monitor Hgb and for signs of bleeding -Microcytic anemia  -Hgb now 9. 4 from 9.8 (unclear of baseline)  -no active signs of bleeding; as per patient, occasional small amount of BRB in stool (FOBT as outpatient was negative, never had colonoscopy)    *iron studies demonstrating low iron and TIBC, with elevated ferritin (ACD- possibly TB vs ILD?)  *consider iron replacement  *no signs of hemolysis (LDH nml and haptoglobin is elevated)  *monitor Hgb and for signs of bleeding

## 2018-10-11 NOTE — PROGRESS NOTE ADULT - PROBLEM SELECTOR PLAN 4
-patient with Na of 133 (possible SIADH/nutritional)  -will monitor for now   -Legionella sent -patient with Na of 134 today (possible SIADH/nutritional)  -will monitor for now   -Legionella sent

## 2018-10-11 NOTE — PROGRESS NOTE ADULT - PROBLEM SELECTOR PLAN 3
-unclear etiology for hypoalbumenia  -patient does eat well  -INR was also elevated, with microcytic anemia (possible nutritional deficiency)    *repeat coagulation studies in the AM  *will follow -unclear etiology for hypoalbumenia  -INR was also elevated, with microcytic anemia (possible nutritional deficiency)    *will follow

## 2018-10-11 NOTE — PROGRESS NOTE ADULT - ASSESSMENT
28 year old [place] male with PMHx of GERD presenting to ED after X-ray performed by GI for chronic cough demonstrated multifocal consolidations, with admission for rule out of TB.

## 2018-10-12 ENCOUNTER — MOBILE ON CALL (OUTPATIENT)
Age: 28
End: 2018-10-12

## 2018-10-12 LAB
ALBUMIN SERPL ELPH-MCNC: 2.7 G/DL — LOW (ref 3.3–5)
ALP SERPL-CCNC: 56 U/L — SIGNIFICANT CHANGE UP (ref 40–120)
ALT FLD-CCNC: 11 U/L — SIGNIFICANT CHANGE UP (ref 4–41)
AST SERPL-CCNC: 14 U/L — SIGNIFICANT CHANGE UP (ref 4–40)
BASOPHILS # BLD AUTO: 0.07 K/UL — SIGNIFICANT CHANGE UP (ref 0–0.2)
BASOPHILS NFR BLD AUTO: 0.6 % — SIGNIFICANT CHANGE UP (ref 0–2)
BILIRUB SERPL-MCNC: 0.2 MG/DL — SIGNIFICANT CHANGE UP (ref 0.2–1.2)
BUN SERPL-MCNC: 15 MG/DL — SIGNIFICANT CHANGE UP (ref 7–23)
CALCIUM SERPL-MCNC: 8.1 MG/DL — LOW (ref 8.4–10.5)
CHLORIDE SERPL-SCNC: 99 MMOL/L — SIGNIFICANT CHANGE UP (ref 98–107)
CO2 SERPL-SCNC: 20 MMOL/L — LOW (ref 22–31)
CREAT SERPL-MCNC: 1.06 MG/DL — SIGNIFICANT CHANGE UP (ref 0.5–1.3)
CULTURE - ACID FAST SMEAR CONCENTRATED: SIGNIFICANT CHANGE UP
DEPRECATED M TB RPOB XXX QL NAA+PROBE: SIGNIFICANT CHANGE UP
EOSINOPHIL # BLD AUTO: 0.27 K/UL — SIGNIFICANT CHANGE UP (ref 0–0.5)
EOSINOPHIL NFR BLD AUTO: 2.3 % — SIGNIFICANT CHANGE UP (ref 0–6)
GLUCOSE SERPL-MCNC: 86 MG/DL — SIGNIFICANT CHANGE UP (ref 70–99)
GRAM STN SPT: SIGNIFICANT CHANGE UP
HCT VFR BLD CALC: 28.1 % — LOW (ref 39–50)
HGB BLD-MCNC: 9.1 G/DL — LOW (ref 13–17)
IMM GRANULOCYTES # BLD AUTO: 0.04 # — SIGNIFICANT CHANGE UP
IMM GRANULOCYTES NFR BLD AUTO: 0.3 % — SIGNIFICANT CHANGE UP (ref 0–1.5)
LYMPHOCYTES # BLD AUTO: 2.81 K/UL — SIGNIFICANT CHANGE UP (ref 1–3.3)
LYMPHOCYTES # BLD AUTO: 24 % — SIGNIFICANT CHANGE UP (ref 13–44)
M TB CMPLX DNA SPT/BRO QL NAA+PROBE: SIGNIFICANT CHANGE UP
MAGNESIUM SERPL-MCNC: 2.1 MG/DL — SIGNIFICANT CHANGE UP (ref 1.6–2.6)
MCHC RBC-ENTMCNC: 25.3 PG — LOW (ref 27–34)
MCHC RBC-ENTMCNC: 32.4 % — SIGNIFICANT CHANGE UP (ref 32–36)
MCV RBC AUTO: 78.1 FL — LOW (ref 80–100)
MONOCYTES # BLD AUTO: 1.27 K/UL — HIGH (ref 0–0.9)
MONOCYTES NFR BLD AUTO: 10.9 % — SIGNIFICANT CHANGE UP (ref 2–14)
MTB RIF RES GENE SPT NAA+PROBE: DETECTED — HIGH
NEUTROPHILS # BLD AUTO: 7.23 K/UL — SIGNIFICANT CHANGE UP (ref 1.8–7.4)
NEUTROPHILS NFR BLD AUTO: 61.9 % — SIGNIFICANT CHANGE UP (ref 43–77)
NRBC # FLD: 0 — SIGNIFICANT CHANGE UP
PHOSPHATE SERPL-MCNC: 5.4 MG/DL — HIGH (ref 2.5–4.5)
PLATELET # BLD AUTO: 281 K/UL — SIGNIFICANT CHANGE UP (ref 150–400)
PMV BLD: 11.5 FL — SIGNIFICANT CHANGE UP (ref 7–13)
POTASSIUM SERPL-MCNC: 4.6 MMOL/L — SIGNIFICANT CHANGE UP (ref 3.5–5.3)
POTASSIUM SERPL-SCNC: 4.6 MMOL/L — SIGNIFICANT CHANGE UP (ref 3.5–5.3)
PROT SERPL-MCNC: 6.7 G/DL — SIGNIFICANT CHANGE UP (ref 6–8.3)
RBC # BLD: 3.6 M/UL — LOW (ref 4.2–5.8)
RBC # FLD: 12.7 % — SIGNIFICANT CHANGE UP (ref 10.3–14.5)
SODIUM SERPL-SCNC: 134 MMOL/L — LOW (ref 135–145)
SPECIMEN SOURCE: SIGNIFICANT CHANGE UP
SPECIMEN SOURCE: SIGNIFICANT CHANGE UP
WBC # BLD: 11.69 K/UL — HIGH (ref 3.8–10.5)
WBC # FLD AUTO: 11.69 K/UL — HIGH (ref 3.8–10.5)

## 2018-10-12 PROCEDURE — 99233 SBSQ HOSP IP/OBS HIGH 50: CPT | Mod: GC

## 2018-10-12 PROCEDURE — 99222 1ST HOSP IP/OBS MODERATE 55: CPT

## 2018-10-12 RX ORDER — ETHAMBUTOL HYDROCHLORIDE 400 MG/1
1200 TABLET, FILM COATED ORAL DAILY
Qty: 0 | Refills: 0 | Status: DISCONTINUED | OUTPATIENT
Start: 2018-10-12 | End: 2018-11-08

## 2018-10-12 RX ORDER — HEXAVITAMINS
300 TABLET ORAL DAILY
Qty: 0 | Refills: 0 | Status: DISCONTINUED | OUTPATIENT
Start: 2018-10-12 | End: 2018-11-08

## 2018-10-12 RX ORDER — PYRAZINAMIDE 500 MG/1
1500 TABLET ORAL DAILY
Qty: 0 | Refills: 0 | Status: DISCONTINUED | OUTPATIENT
Start: 2018-10-12 | End: 2018-11-08

## 2018-10-12 RX ADMIN — PYRAZINAMIDE 1500 MILLIGRAM(S): 500 TABLET ORAL at 19:04

## 2018-10-12 RX ADMIN — ETHAMBUTOL HYDROCHLORIDE 1200 MILLIGRAM(S): 400 TABLET, FILM COATED ORAL at 19:05

## 2018-10-12 RX ADMIN — PANTOPRAZOLE SODIUM 40 MILLIGRAM(S): 20 TABLET, DELAYED RELEASE ORAL at 19:05

## 2018-10-12 RX ADMIN — PANTOPRAZOLE SODIUM 40 MILLIGRAM(S): 20 TABLET, DELAYED RELEASE ORAL at 06:18

## 2018-10-12 RX ADMIN — AZITHROMYCIN 250 MILLIGRAM(S): 500 TABLET, FILM COATED ORAL at 00:18

## 2018-10-12 RX ADMIN — Medication 300 MILLIGRAM(S): at 19:04

## 2018-10-12 RX ADMIN — SODIUM CHLORIDE 3 MILLILITER(S): 9 INJECTION INTRAMUSCULAR; INTRAVENOUS; SUBCUTANEOUS at 09:42

## 2018-10-12 NOTE — PROGRESS NOTE ADULT - PROBLEM SELECTOR PLAN 3
-unclear etiology for hypoalbumenia  -INR was also elevated, with microcytic anemia (possible nutritional deficiency)    *will follow -unclear etiology for hypoalbumenia  -INR was also elevated, with microcytic anemia (possible nutritional deficiency)  *OP records as above demonstrating possible infectious/autoimmune etiology    *will follow

## 2018-10-12 NOTE — PROGRESS NOTE ADULT - PROBLEM SELECTOR PLAN 2
-Microcytic anemia  -Hgb now 9. 4 from 9.8 (unclear of baseline)  -no active signs of bleeding; as per patient, occasional small amount of BRB in stool (FOBT as outpatient was negative, never had colonoscopy)    *iron studies demonstrating low iron and TIBC, with elevated ferritin (ACD- possibly TB vs ILD?)  *consider iron replacement  *no signs of hemolysis (LDH nml and haptoglobin is elevated)  *monitor Hgb and for signs of bleeding -Microcytic anemia  -Hgb slowly trending down (9.1 from 9.4, 9.8)  -no active signs of bleeding; as per patient, occasional small amount of BRB in stool (FOBT as outpatient was negative, never had colonoscopy)    *iron studies demonstrating low iron and TIBC, with elevated ferritin (ACD- possibly TB vs ILD?)  *consider iron replacement  *no signs of hemolysis (LDH nml and haptoglobin is elevated)  *monitor Hgb and for signs of bleeding  *OP records demonstrating low reticulocyte count, elevated ESR and CRP, with paraproteinemia without significant ratio, as well as elevated IgG (possible infectious/autoimmune process)

## 2018-10-12 NOTE — CONSULT NOTE ADULT - ASSESSMENT
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  AFB smear + PCR MTB,    RIPE started.    Suggest: would send another sputum for AFB.                continue RIPE                 baseline eye exam                 airborne precautions.    Family will need TB screening.  Patient's wife will contact pediatrician.    ID service available for questions over weekend.    Obdulia Carrillo MD  Pager: [phone]  After 5 PM or weekends please call fellow on call or office [phone]

## 2018-10-12 NOTE — PROGRESS NOTE ADULT - SUBJECTIVE AND OBJECTIVE BOX
Tabatha Bowman, PGY1   Contact/Pager - [phone] / [phone]      Patient is a 28y old  Male who presents with a chief complaint of long term cough and concerning CXR findings, admitted for TB rule out (11 Oct 2018 07:04)        SUBJECTIVE / OVERNIGHT EVENTS:  no events overnight  patient is feeling well this morning, with productive cough      MEDICATIONS  (STANDING):  azithromycin  IVPB 500 milliGRAM(s) IV Intermittent every 24 hours  cefTRIAXone   IVPB 1 Gram(s) IV Intermittent every 24 hours  pantoprazole    Tablet 40 milliGRAM(s) Oral two times a day  sodium chloride 3%  Inhalation 3 milliLiter(s) Inhalation every 8 hours    MEDICATIONS  (PRN):      Allergies    Advil (Swelling)  Motrin (Swelling)  Tylenol (Swelling)    Intolerances          Vital Signs Last 24 Hrs  T(C): 37.4 (12 Oct 2018 06:17), Max: 37.6 (11 Oct 2018 13:22)  T(F): 99.4 (12 Oct 2018 06:17), Max: 99.6 (11 Oct 2018 13:22)  HR: 92 (12 Oct 2018 06:17) (81 - 98)  BP: 117/71 (12 Oct 2018 06:17) (110/65 - 119/79)  BP(mean): --  RR: 19 (12 Oct 2018 06:17) (18 - 19)  SpO2: 100% (12 Oct 2018 06:17) (99% - 100%)  CAPILLARY BLOOD GLUCOSE        I&O's Summary        PHYSICAL EXAM:  GENERAL: NAD, well-developed  HEAD:  AT, NC  EYES: EOMI, PERRLA, conjunctiva and sclera clear  ENMT: Airway patent. MMM. Good dentition, no lesions.  NECK: Supple, No JVD  CHEST/LUNG: CTABL; No wheezing, rhonci, or rales  HEART: RRR; Normal S1, S2. No murmurs, rubs, or gallops  ABDOMEN: Soft, NT, ND; Bowel sounds present. No organomegaly  EXTREMITIES:  2+ Peripheral Pulses, No clubbing, cyanosis, or edema  PSYCH: AAOx3  NEUROLOGY: non-focal  SKIN: Warm, dry, intact; No rashes or lesions      LABS:                        9.1    11.69 )-----------( 281      ( 12 Oct 2018 05:11 )             28.1     10-12    134<L>  |  99  |  15  ----------------------------<  86  4.6   |  20<L>  |  1.06    Ca    8.1<L>      12 Oct 2018 05:11  Phos  5.4     10-12  Mg     2.1     10-12    TPro  6.7  /  Alb  2.7<L>  /  TBili  0.2  /  DBili  x   /  AST  14  /  ALT  11  /  AlkPhos  56  10-12    LIVER FUNCTIONS - ( 12 Oct 2018 05:11 )  Alb: 2.7 g/dL / Pro: 6.7 g/dL / ALK PHOS: 56 u/L / ALT: 11 u/L / AST: 14 u/L / GGT: x           PT/INR - ( 11 Oct 2018 05:05 )   PT: 16.3 SEC;   INR: 1.46          PTT - ( 11 Oct 2018 05:05 )  PTT:30.1 SEC            Culture - Acid Fast - Sputum w/Smear (collected 10 Oct 2018 19:10)  Source: SPUTUM  Final Report (11 Oct 2018 14:07):    AFB SMEAR= NO ACID FAST BACILLI SEEN    Culture - Blood (collected 10 Oct 2018 14:31)  Source: BLOOD  Preliminary Report (11 Oct 2018 14:31):    NO ORGANISMS ISOLATED    NO ORGANISMS ISOLATED AT 24 HOURS    Culture - Blood (collected 10 Oct 2018 14:31)  Source: BLOOD  Preliminary Report (11 Oct 2018 14:31):    NO ORGANISMS ISOLATED    NO ORGANISMS ISOLATED AT 24 HOURS          RADIOLOGY & ADDITIONAL TESTS:    Imaging Personally Reviewed: YES    Consultant(s) Notes Reviewed: YES    Care Discussed with Consultants/Other Providers: YES

## 2018-10-12 NOTE — PROGRESS NOTE ADULT - PROBLEM SELECTOR PLAN 1
-6 months of dry cough, with findings of multifocal consolidations on CXR  -followed by GI outpatient for GERD causing these symptoms (improvement of cough with omeprazole and with eating)   -now CXR with multiple consolidations and possible appearance of interstitial lung disease  Differential: GERD vs. TB vs. pneumonia vs. ILD 2/2 to rheumatologic condition?   -now febrile overnight (GERD less likely)    *f/u CT of the chest  *continue ceftriaxone and azithromycin   *f/u Quantiferon gold  *will obtain 3 sputum samples (attempt hypertonic saline for sample)   *Blood and urine cultures and Legionella sent   *will continue with pantoprazole in the hospital -6 months of dry cough, with findings of multifocal consolidations on CXR  -followed by GI outpatient for GERD causing these symptoms (improvement of cough with omeprazole and with eating)   -Blood and urine cultures and Legionella negative    *will obtain 3 sputum samples (attempt hypertonic saline for sample) - first sample AFB smear negative  *continue ceftriaxone, d/c azithromycin  *f/u official CT chest read  *f/u Quantiferon gold  *will continue with pantoprazole in the hospital

## 2018-10-12 NOTE — PROGRESS NOTE ADULT - ATTENDING COMMENTS
Pt seen and examined with HS on above date.  Agree with above HS note.  Plan discussed with House staff.    28 M with multifocal pna, cough, fevers, anemia of chronic disease and malnutrition. He's originally from [place] but has been in the [place] since he was a teenager, denies weight loss/drenching night sweats.   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  Agree with above HS note.  Plan discussed with House staff.    28 M with multifocal pna, cough, fevers, anemia of chronic disease and malnutrition. He's originally from [place] but has been in the [place] since he was a teenager, denies weight loss/drenching night sweats.   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

## 2018-10-12 NOTE — PROGRESS NOTE ADULT - PROBLEM SELECTOR PLAN 4
-patient with Na of 134 today (possible SIADH/nutritional)  -will monitor for now   -Legionella sent -patient with Na of 134 today (possible SIADH/nutritional)  -will monitor for now   -Legionella negative

## 2018-10-12 NOTE — PROGRESS NOTE ADULT - ASSESSMENT
28 year old Algerian male with PMHx of GERD presenting to ED after X-ray performed by GI for chronic cough demonstrated multifocal consolidations, with admission for rule out of TB.

## 2018-10-12 NOTE — CONSULT NOTE ADULT - SUBJECTIVE AND OBJECTIVE BOX
HPI:  28 year old male with PMHx of GERD on omeprazole presenting due to abnormal chest xray done by GI doctor in the setting of 6 months of cough.  Sweats+  no weight loss, SOB, chest pain, hemoptysis.    CxR suspicious for TB.  Sputum AFB +  Born in [place]. Known PPD + . did not taked INH.  Received BCG as child.  Mother had TB and was treated in [place].  Lives at home with wife, 2 children ages 2 and 3, mother, mother-in-law and 7 year old.    In the ED, his vital signs were wnl. The CXR demonstrated multifocal consolidations concerning for infection. His laboratory values were significant for increased WBC, and anemia (9.8 with baseline unclear). He was also slightly hyponatremic. In the ED he was given azithromycin and ceftriaxone. Blood and urine cultures were sent, with Quantiferon Gold, and Legionella. Obtaining sputum cultures was also attempted. (10 Oct 2018 16:37)      PAST MEDICAL & SURGICAL HISTORY:  GERD (gastroesophageal reflux disease)  No significant past surgical history      Allergies    Advil (Swelling)  Motrin (Swelling)  Tylenol (Swelling)    Intolerances        ANTIMICROBIALS:  ethambutol 1200 daily  isoniazid 300 daily  pyrazinamide 1500 daily  rifampin 600 daily      OTHER MEDS:  pantoprazole    Tablet 40 milliGRAM(s) Oral two times a day      SOCIAL HISTORY:    FAMILY HISTORY:  Family history of breast cancer in mother (Mother)      REVIEW OF SYSTEMS  [  ] ROS unobtainable because:    [ x ] All other systems negative except as noted below:	    Constitutional:  [ ] fever [ ] chills  [ ] weight loss  [x ] weakness  Skin:  [ ] rash [ ] phlebitis	  Eyes: [ ] icterus [ ] pain  [ ] discharge	  ENMT: [ ] sore throat  [ ] thrush [ ] ulcers [ ] exudates  Respiratory: [ ] dyspnea [ ] hemoptysis [x ] cough [ ] sputum	  Cardiovascular:  [ ] chest pain [ ] palpitations [ ] edema	  Gastrointestinal:  [ ] nausea [ ] vomiting [ ] diarrhea [ ] constipation [ ] pain	  Genitourinary:  [ ] dysuria [ ] frequency [ ] hematuria [ ] discharge [ ] flank pain  [ ] incontinence  Musculoskeletal:  [ ] myalgias [ ] arthralgias [ ] arthritis  [ ] back pain  Neurological:  [ ] headache [ ] seizures  [ ] confusion/altered mental status  Psychiatric:  [ ] anxiety [ ] depression	  Hematology/Lymphatics:  [ ] lymphadenopathy  Endocrine:  [ ] adrenal [ ] thyroid  Allergic/Immunologic:	 [ ] transplant [ ] seasonal    PHYSICAL EXAM:  General: [x ] non-toxic  HEAD/EYES: [ ] PERRL [x ] white sclera [ ] icterus  ENT:  [ ] normal [x ] supple [ ] thrush [ ] pharyngeal exudate  Cardiovascular:   [ ] murmur [x ] normal [ ] PPM/AICD  Respiratory:  [x ] few crackles  GI:  [ ] soft, non-tender, normalb owel sounds  :  [ ] schuler [x ] no CVA tenderness   Musculoskeletal:  [x ] no synovitis, tenderness over spine  Neurologic:  [ ] non-focal exam   Skin:  [x ] no rash  Lymph: [x ] no lymphadenopathy  Psychiatric:  [x ] appropriate affect [x ] alert & oriented  Lines:  [ ] no phlebitis [ ] central line          Drug Dosing Weight  Height (cm): 182.88 (10 Oct 2018 19:51)  Weight (kg): 59.9 (10 Oct 2018 19:51)  BMI (kg/m2): 17.9 (10 Oct 2018 19:51)  BSA (m2): 1.79 (10 Oct 2018 19:51)    Vital Signs Last 24 Hrs  T(F): 98.1 (10-12-18 @ 14:02), Max: 101.8 (10-10-18 @ 19:15)    Vital Signs Last 24 Hrs  HR: 77 (10-12-18 @ 14:02) (77 - 98)  BP: 106/70 (10-12-18 @ 14:02) (106/70 - 119/79)  RR: 18 (10-12-18 @ 14:02)  SpO2: 100% (10-12-18 @ 14:02) (97% - 100%)  Wt(kg): --                          9.1    11.69 )-----------( 281      ( 12 Oct 2018 05:11 )             28.1       10-12    134<L>  |  99  |  15  ----------------------------<  86  4.6   |  20<L>  |  1.06    Ca    8.1<L>      12 Oct 2018 05:11  Phos  5.4     10-12  Mg     2.1     10-12    TPro  6.7  /  Alb  2.7<L>  /  TBili  0.2  /  DBili  x   /  AST  14  /  ALT  11  /  AlkPhos  56  10-12          MICROBIOLOGY:  SPUTUM  10-12-18 --  --  --      SPUTUM  10-10-18 --  --  --      BLOOD  10-10-18 --  --  --    Culture - Acid Fast Smear Concentrated:   RESULT CALLED TO / READ BACK: SENG DELEON RN./Y  DATE / TIME CALLED: 10/12/18 1110  CALLED BY: TERRELL FLORES  RESULT CALLED TO / READ BACK: KIMBERLEY ZARATE  IC/Y  DATE / TIME CALLED: 10/12/18 1239  CALLED BY: TERRELL FLORES                *******************************                * This is an appended result. *                *******************************  A prior result that was reported as final has been changed.                *******************************                * This is an appended result. *                *******************************  A prior result that was reported as final has been changed.  AFB^Acid Fast Bacilli  QNTY AFB BY FLUOR. STAIN: (4+) MANY  QNTY AFB BY KINYOUN STAIN: (2+) FEW (10.12.18 @ 00:49)    PCR MTB        RADIOLOGY:    < from: Xray Chest 2 Views PA/Lat (10.10.18 @ 11:42) >    Impression:    Multifocal consolidations, likely infectious. Given history, tuberculosis   should be considered. The patient  was sent to the emergency room for further evaluation.    The findings were discussed with Dr. Miranda at time of final signing.     < end of copied text >  < from: CT Chest No Cont (10.11.18 @ 22:37) >    FINDINGS:    CHEST:     LUNGS AND LARGE AIRWAYS: Patent central airways. Thick-walled cavitary   lesions, tree-in-bud opacities, and nodules scattered throughout all lung   lobes, most prominent in the upper lobes. The largest cavitary lesion in   the right upper lobe measures 4.5 x 3.9 cm (2, 21).  PLEURA: No pleural effusion.  VESSELS: Within normal limits.  HEART: Heart size is normal.No pericardial effusion.  MEDIASTINUM AND DEAN: No lymphadenopathy.  CHEST WALL AND LOWER NECK: Within normal limits.  VISUALIZED UPPER ABDOMEN: Within normal limits.  BONES: Within normal limits.    IMPRESSION:   Diffuse, upper lobe predominant thick-walled cavitary lesions,   tree-in-bud opacities, and nodules. Given the clinical circumstances,   primary differential consideration is tuberculosis. Follow-up resolution   is recommended.    < end of copied text >

## 2018-10-13 DIAGNOSIS — K21.9 GASTRO-ESOPHAGEAL REFLUX DISEASE WITHOUT ESOPHAGITIS: ICD-10-CM

## 2018-10-13 DIAGNOSIS — A15.0 TUBERCULOSIS OF LUNG: ICD-10-CM

## 2018-10-13 LAB
ALBUMIN SERPL ELPH-MCNC: 2.8 G/DL — LOW (ref 3.3–5)
ALP SERPL-CCNC: 58 U/L — SIGNIFICANT CHANGE UP (ref 40–120)
ALT FLD-CCNC: 11 U/L — SIGNIFICANT CHANGE UP (ref 4–41)
AST SERPL-CCNC: 12 U/L — SIGNIFICANT CHANGE UP (ref 4–40)
BASOPHILS # BLD AUTO: 0.06 K/UL — SIGNIFICANT CHANGE UP (ref 0–0.2)
BASOPHILS NFR BLD AUTO: 0.4 % — SIGNIFICANT CHANGE UP (ref 0–2)
BILIRUB SERPL-MCNC: 0.6 MG/DL — SIGNIFICANT CHANGE UP (ref 0.2–1.2)
BUN SERPL-MCNC: 13 MG/DL — SIGNIFICANT CHANGE UP (ref 7–23)
CALCIUM SERPL-MCNC: 8.6 MG/DL — SIGNIFICANT CHANGE UP (ref 8.4–10.5)
CHLORIDE SERPL-SCNC: 101 MMOL/L — SIGNIFICANT CHANGE UP (ref 98–107)
CO2 SERPL-SCNC: 23 MMOL/L — SIGNIFICANT CHANGE UP (ref 22–31)
CREAT SERPL-MCNC: 0.92 MG/DL — SIGNIFICANT CHANGE UP (ref 0.5–1.3)
EOSINOPHIL # BLD AUTO: 0.24 K/UL — SIGNIFICANT CHANGE UP (ref 0–0.5)
EOSINOPHIL NFR BLD AUTO: 1.8 % — SIGNIFICANT CHANGE UP (ref 0–6)
GLUCOSE SERPL-MCNC: 85 MG/DL — SIGNIFICANT CHANGE UP (ref 70–99)
HCT VFR BLD CALC: 30.4 % — LOW (ref 39–50)
HGB BLD-MCNC: 9.6 G/DL — LOW (ref 13–17)
IMM GRANULOCYTES # BLD AUTO: 0.06 # — SIGNIFICANT CHANGE UP
IMM GRANULOCYTES NFR BLD AUTO: 0.4 % — SIGNIFICANT CHANGE UP (ref 0–1.5)
LYMPHOCYTES # BLD AUTO: 2.98 K/UL — SIGNIFICANT CHANGE UP (ref 1–3.3)
LYMPHOCYTES # BLD AUTO: 22.3 % — SIGNIFICANT CHANGE UP (ref 13–44)
MAGNESIUM SERPL-MCNC: 2 MG/DL — SIGNIFICANT CHANGE UP (ref 1.6–2.6)
MCHC RBC-ENTMCNC: 24.3 PG — LOW (ref 27–34)
MCHC RBC-ENTMCNC: 31.6 % — LOW (ref 32–36)
MCV RBC AUTO: 77 FL — LOW (ref 80–100)
MONOCYTES # BLD AUTO: 1.35 K/UL — HIGH (ref 0–0.9)
MONOCYTES NFR BLD AUTO: 10.1 % — SIGNIFICANT CHANGE UP (ref 2–14)
NEUTROPHILS # BLD AUTO: 8.68 K/UL — HIGH (ref 1.8–7.4)
NEUTROPHILS NFR BLD AUTO: 65 % — SIGNIFICANT CHANGE UP (ref 43–77)
NRBC # FLD: 0 — SIGNIFICANT CHANGE UP
PHOSPHATE SERPL-MCNC: 4.6 MG/DL — HIGH (ref 2.5–4.5)
PLATELET # BLD AUTO: 310 K/UL — SIGNIFICANT CHANGE UP (ref 150–400)
PMV BLD: 11.3 FL — SIGNIFICANT CHANGE UP (ref 7–13)
POTASSIUM SERPL-MCNC: 5.3 MMOL/L — SIGNIFICANT CHANGE UP (ref 3.5–5.3)
POTASSIUM SERPL-SCNC: 5.3 MMOL/L — SIGNIFICANT CHANGE UP (ref 3.5–5.3)
PROT SERPL-MCNC: 7.1 G/DL — SIGNIFICANT CHANGE UP (ref 6–8.3)
RBC # BLD: 3.95 M/UL — LOW (ref 4.2–5.8)
RBC # FLD: 12.5 % — SIGNIFICANT CHANGE UP (ref 10.3–14.5)
SODIUM SERPL-SCNC: 135 MMOL/L — SIGNIFICANT CHANGE UP (ref 135–145)
WBC # BLD: 13.37 K/UL — HIGH (ref 3.8–10.5)
WBC # FLD AUTO: 13.37 K/UL — HIGH (ref 3.8–10.5)

## 2018-10-13 PROCEDURE — 99232 SBSQ HOSP IP/OBS MODERATE 35: CPT

## 2018-10-13 RX ORDER — PYRIDOXINE HCL (VITAMIN B6) 100 MG
50 TABLET ORAL DAILY
Qty: 0 | Refills: 0 | Status: DISCONTINUED | OUTPATIENT
Start: 2018-10-13 | End: 2018-11-08

## 2018-10-13 RX ORDER — SODIUM CHLORIDE 9 MG/ML
3 INJECTION INTRAMUSCULAR; INTRAVENOUS; SUBCUTANEOUS EVERY 8 HOURS
Qty: 0 | Refills: 0 | Status: DISCONTINUED | OUTPATIENT
Start: 2018-10-13 | End: 2018-10-14

## 2018-10-13 RX ORDER — MULTIVIT-MIN/FERROUS GLUCONATE 9 MG/15 ML
1 LIQUID (ML) ORAL DAILY
Qty: 0 | Refills: 0 | Status: DISCONTINUED | OUTPATIENT
Start: 2018-10-13 | End: 2018-10-13

## 2018-10-13 RX ADMIN — Medication 50 MILLIGRAM(S): at 12:59

## 2018-10-13 RX ADMIN — SODIUM CHLORIDE 3 MILLILITER(S): 9 INJECTION INTRAMUSCULAR; INTRAVENOUS; SUBCUTANEOUS at 23:55

## 2018-10-13 RX ADMIN — SODIUM CHLORIDE 3 MILLILITER(S): 9 INJECTION INTRAMUSCULAR; INTRAVENOUS; SUBCUTANEOUS at 15:39

## 2018-10-13 RX ADMIN — PYRAZINAMIDE 1500 MILLIGRAM(S): 500 TABLET ORAL at 13:00

## 2018-10-13 RX ADMIN — PANTOPRAZOLE SODIUM 40 MILLIGRAM(S): 20 TABLET, DELAYED RELEASE ORAL at 17:41

## 2018-10-13 RX ADMIN — Medication 300 MILLIGRAM(S): at 12:59

## 2018-10-13 RX ADMIN — PANTOPRAZOLE SODIUM 40 MILLIGRAM(S): 20 TABLET, DELAYED RELEASE ORAL at 06:29

## 2018-10-13 RX ADMIN — ETHAMBUTOL HYDROCHLORIDE 1200 MILLIGRAM(S): 400 TABLET, FILM COATED ORAL at 12:59

## 2018-10-13 RX ADMIN — Medication 1 TABLET(S): at 12:59

## 2018-10-13 NOTE — DIETITIAN INITIAL EVALUATION ADULT. - NS AS NUTRI INTERV MEALS SNACK
1. Continue Regular diet. 2. Continue multivitamin daily for micronutrient coverage. 3. Obtain daily weights. 4. Please Encourage po intake, assist with meals and menu selections, provide alternatives PRN. 5. Monitor weights, labs, BM's, skin integrity, p.o. intake.

## 2018-10-13 NOTE — PROGRESS NOTE ADULT - PROBLEM SELECTOR PLAN 1
Patient with cavitary lesions in CT scan, with positive sputum cultures concerns for pulmonary TB.   - ID recs appreciated.   - C/w RIPE therapy.   - C/w daily vitamin b6 50mg daily

## 2018-10-13 NOTE — PROGRESS NOTE ADULT - SUBJECTIVE AND OBJECTIVE BOX
Roshan Tello  PGY-1 Resident Physician   Pager [phone] or [phone] from 7am to 7pm, after 7pm    Patient is a 28y old  Male who presents with a chief complaint of long term cough and concerning CXR findings, admitted for TB rule out (12 Oct 2018 15:57)    SUBJECTIVE / OVERNIGHT EVENTS:  No acute overnight events. Patient endorsed dry cough and pleuritic chest pain when coughing. No fever, chills, sob, nausea, vomiting, diarrhea, abdominal pain.     MEDICATIONS  (STANDING):  ethambutol 1200 milliGRAM(s) Oral daily  isoniazid 300 milliGRAM(s) Oral daily  multivitamin 1 Tablet(s) Oral daily  pantoprazole    Tablet 40 milliGRAM(s) Oral two times a day  pyrazinamide 1500 milliGRAM(s) Oral daily  pyridoxine 50 milliGRAM(s) Oral daily  rifampin 600 milliGRAM(s) Oral daily    MEDICATIONS  (PRN):  sodium chloride 3%  Inhalation 3 milliLiter(s) Inhalation every 8 hours PRN COUGH  Allergies  Advil (Swelling)  Motrin (Swelling)  Tylenol (Swelling)    Intolerances    Vital Signs Last 24 Hrs  T(C): 37.7 (13 Oct 2018 06:35), Max: 37.7 (13 Oct 2018 06:35)  T(F): 99.9 (13 Oct 2018 06:35), Max: 99.9 (13 Oct 2018 06:35)  HR: 75 (13 Oct 2018 06:35) (75 - 80)  BP: 109/68 (13 Oct 2018 06:35) (106/70 - 116/78)  RR: 18 (13 Oct 2018 06:35) (18 - 18)  SpO2: 100% (13 Oct 2018 06:35) (100% - 100%)  CAPILLARY BLOOD GLUCOSE    I&O's Summary    PHYSICAL EXAM:  GENERAL: NAD, well-developed  HEAD:  Atraumatic, Normocephalic  EYES: EOMI, PERRLA, conjunctiva and sclera clear  NECK: Supple, No JVD  CHEST/LUNG: Clear to auscultation bilaterally; No wheeze  HEART: Regular rate and rhythm; Normal S1 S2, No murmurs, rubs, or gallops  ABDOMEN: Soft, Nontender, Nondistended; Bowel sounds present  EXTREMITIES:  2+ Peripheral Pulses, No clubbing, cyanosis, or edema  PSYCH: AAOx3  NEUROLOGY: non-focal  SKIN: No rashes or lesions    LABS:                        9.6    13.37 )-----------( 310      ( 13 Oct 2018 06:36 )             30.4     Hgb Trend: 9.6<--, 9.1<--, 9.4<--, 9.8<--  10-13    135  |  101  |  13  ----------------------------<  85  5.3   |  23  |  0.92    Ca    8.6      13 Oct 2018 06:36  Phos  4.6     10-13  Mg     2.0     10-13    TPro  7.1  /  Alb  2.8<L>  /  TBili  0.6  /  DBili  x   /  AST  12  /  ALT  11  /  AlkPhos  58  10-13    Creatinine Trend: 0.92<--, 1.06<--, 1.08<--, 1.10<--  LIVER FUNCTIONS - ( 13 Oct 2018 06:36 )  Alb: 2.8 g/dL / Pro: 7.1 g/dL / ALK PHOS: 58 u/L / ALT: 11 u/L / AST: 12 u/L / GGT: x           Culture - Respiratory with Gram Stain (10.12.18 @ 22:55)    Gram Stain Sputum:   WBC^White Blood Cells  QNTY CELLS IN GRAM STAIN: RARE (1+)  GPCC[facility]^Gram Pos Cocci in Chains    Specimen Source: SPUTUM    RADIOLOGY & ADDITIONAL TESTS:    Imaging Personally Reviewed:    Consultant(s) Notes Reviewed:      Care Discussed with Consultants/Other Providers:

## 2018-10-13 NOTE — PROGRESS NOTE ADULT - PROBLEM SELECTOR PLAN 2
-Microcytic anemia  -Hgb slowly trending down (9.1 from 9.4, 9.8)  -no active signs of bleeding; as per patient, occasional small amount of BRB in stool (FOBT as outpatient was negative, never had colonoscopy)    *iron studies demonstrating low iron and TIBC, with elevated ferritin (ACD- possibly TB vs ILD?)  *consider iron replacement  *no signs of hemolysis (LDH nml and haptoglobin is elevated)  *monitor Hgb and for signs of bleeding  *OP records demonstrating low reticulocyte count, elevated ESR and CRP, with paraproteinemia without significant ratio, as well as elevated IgG (possible infectious/autoimmune process)    - c/w Multivitamin daily

## 2018-10-13 NOTE — DIETITIAN INITIAL EVALUATION ADULT. - ORAL INTAKE PTA
Patient reports good appetite and PO intake PTA. Normally consumes 3 meals daily with snacks in between meals./good

## 2018-10-13 NOTE — DIETITIAN INITIAL EVALUATION ADULT. - OTHER INFO
Patient seen for nutrition consult for assessment. Per chart review patient with medical history of of GERD presenting to ED after X-ray performed by GI for chronic cough demonstrated multifocal consolidations, with admission for rule out of TB, AFB smear + PCR MTB - RIPE therapy started. Patient and wife believe that patient may not have GERD and that patient's symptoms were due to TB. Patient reports eating well PTA, good appetite. Denies any decrease in PO intake. Eats 3 meals daily as well as snacks. Denies any recent change in weight. Reports UBW ranges from 132-135#. Patient notes he has always been thin and small-framed. NKFA. Patient states that he has episodes of emesis after severe coughing. No GI distress (nausea/vomiting/diarrhea/constipation) at this time. Patient tolerating diet in-house. PO intake depends on whether patient likes the food provided. If patient likes hospital meal he will eat 100%. If patient does not like meal, wife brings in food from outside hospital. Encouraged PO intake and reviewed high protein/high calorie foods.

## 2018-10-13 NOTE — DIETITIAN INITIAL EVALUATION ADULT. - ENERGY NEEDS
Weight: 59.9# (10/10)  Height: 182.88cm  BMI: 17.9kg/m^2  IBW: 80.9kg +/-10%  *IBW used to calculate protein needs.

## 2018-10-13 NOTE — DIETITIAN INITIAL EVALUATION ADULT. - PERTINENT MEDS FT
MEDICATIONS  (STANDING):  ethambutol 1200 milliGRAM(s) Oral daily  isoniazid 300 milliGRAM(s) Oral daily  multivitamin 1 Tablet(s) Oral daily  pantoprazole    Tablet 40 milliGRAM(s) Oral two times a day  pyrazinamide 1500 milliGRAM(s) Oral daily  pyridoxine 50 milliGRAM(s) Oral daily  rifampin 600 milliGRAM(s) Oral daily

## 2018-10-13 NOTE — PROGRESS NOTE ADULT - PROBLEM SELECTOR PLAN 4
- patient with Na of 135 (possible SIADH/nutritional)  - will monitor for now   - Legionella negative

## 2018-10-13 NOTE — PROGRESS NOTE ADULT - ATTENDING COMMENTS
Pt seen and examined with HS on above date.  Agree with above HS note.  Plan discussed with House staff.    28 M with multifocal pna, cough, fevers, anemia of chronic disease and malnutrition. He's originally from [place] but has been in the [place] since he was a teenager, denies weight loss/drenching night sweats.   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

## 2018-10-13 NOTE — PROGRESS NOTE ADULT - PROBLEM SELECTOR PLAN 3
-unclear etiology for hypoalbuminemia  -INR was also elevated, with microcytic anemia (possible nutritional deficiency)  *OP records as above demonstrating possible infectious/autoimmune etiology

## 2018-10-13 NOTE — DIETITIAN INITIAL EVALUATION ADULT. - PROBLEM SELECTOR PLAN 1
-6 months of dry cough, with findings of multifocal consolidations on CXR  -followed by GI outpatient for GERD causing these symptoms (improvement of cough with omeprazole and with eating)   -now CXR with multiple consolidations and possible appearance of interstitial lung disease  Differential: GERD vs. TB vs. pneumonia vs. ILD 2/2 to rheumatologic condition?     *will send for CT of the chest  *start ceftriaxone and azithromycin   *Quantiferon gold sent  *will obtain 3 sputum samples (if no sputum production, can try hypertonic saline)   *Blood and urine cultures and Legionella sent   *will continue with pantoprazole in the hospital

## 2018-10-14 ENCOUNTER — RX RENEWAL (OUTPATIENT)
Age: 28
End: 2018-10-14

## 2018-10-14 LAB
ALBUMIN SERPL ELPH-MCNC: 2.9 G/DL — LOW (ref 3.3–5)
ALP SERPL-CCNC: 59 U/L — SIGNIFICANT CHANGE UP (ref 40–120)
ALT FLD-CCNC: 13 U/L — SIGNIFICANT CHANGE UP (ref 4–41)
AST SERPL-CCNC: 14 U/L — SIGNIFICANT CHANGE UP (ref 4–40)
BASOPHILS # BLD AUTO: 0.07 K/UL — SIGNIFICANT CHANGE UP (ref 0–0.2)
BASOPHILS NFR BLD AUTO: 0.6 % — SIGNIFICANT CHANGE UP (ref 0–2)
BILIRUB SERPL-MCNC: 0.6 MG/DL — SIGNIFICANT CHANGE UP (ref 0.2–1.2)
BUN SERPL-MCNC: 13 MG/DL — SIGNIFICANT CHANGE UP (ref 7–23)
CALCIUM SERPL-MCNC: 8.3 MG/DL — LOW (ref 8.4–10.5)
CHLORIDE SERPL-SCNC: 99 MMOL/L — SIGNIFICANT CHANGE UP (ref 98–107)
CO2 SERPL-SCNC: 23 MMOL/L — SIGNIFICANT CHANGE UP (ref 22–31)
CREAT SERPL-MCNC: 0.9 MG/DL — SIGNIFICANT CHANGE UP (ref 0.5–1.3)
CULTURE - ACID FAST SMEAR CONCENTRATED: SIGNIFICANT CHANGE UP
CULTURE - ACID FAST SMEAR CONCENTRATED: SIGNIFICANT CHANGE UP
EOSINOPHIL # BLD AUTO: 0.19 K/UL — SIGNIFICANT CHANGE UP (ref 0–0.5)
EOSINOPHIL NFR BLD AUTO: 1.5 % — SIGNIFICANT CHANGE UP (ref 0–6)
GLUCOSE SERPL-MCNC: 91 MG/DL — SIGNIFICANT CHANGE UP (ref 70–99)
HCT VFR BLD CALC: 31.5 % — LOW (ref 39–50)
HGB BLD-MCNC: 10 G/DL — LOW (ref 13–17)
IMM GRANULOCYTES # BLD AUTO: 0.04 # — SIGNIFICANT CHANGE UP
IMM GRANULOCYTES NFR BLD AUTO: 0.3 % — SIGNIFICANT CHANGE UP (ref 0–1.5)
LYMPHOCYTES # BLD AUTO: 25.1 % — SIGNIFICANT CHANGE UP (ref 13–44)
LYMPHOCYTES # BLD AUTO: 3.09 K/UL — SIGNIFICANT CHANGE UP (ref 1–3.3)
MAGNESIUM SERPL-MCNC: 1.9 MG/DL — SIGNIFICANT CHANGE UP (ref 1.6–2.6)
MCHC RBC-ENTMCNC: 24.7 PG — LOW (ref 27–34)
MCHC RBC-ENTMCNC: 31.7 % — LOW (ref 32–36)
MCV RBC AUTO: 77.8 FL — LOW (ref 80–100)
MONOCYTES # BLD AUTO: 1.21 K/UL — HIGH (ref 0–0.9)
MONOCYTES NFR BLD AUTO: 9.8 % — SIGNIFICANT CHANGE UP (ref 2–14)
NEUTROPHILS # BLD AUTO: 7.71 K/UL — HIGH (ref 1.8–7.4)
NEUTROPHILS NFR BLD AUTO: 62.7 % — SIGNIFICANT CHANGE UP (ref 43–77)
NRBC # FLD: 0 — SIGNIFICANT CHANGE UP
PHOSPHATE SERPL-MCNC: 4.8 MG/DL — HIGH (ref 2.5–4.5)
PLATELET # BLD AUTO: 334 K/UL — SIGNIFICANT CHANGE UP (ref 150–400)
PMV BLD: 11.2 FL — SIGNIFICANT CHANGE UP (ref 7–13)
POTASSIUM SERPL-MCNC: 4.4 MMOL/L — SIGNIFICANT CHANGE UP (ref 3.5–5.3)
POTASSIUM SERPL-SCNC: 4.4 MMOL/L — SIGNIFICANT CHANGE UP (ref 3.5–5.3)
PROT SERPL-MCNC: 7.4 G/DL — SIGNIFICANT CHANGE UP (ref 6–8.3)
RBC # BLD: 4.05 M/UL — LOW (ref 4.2–5.8)
RBC # FLD: 12.6 % — SIGNIFICANT CHANGE UP (ref 10.3–14.5)
SODIUM SERPL-SCNC: 136 MMOL/L — SIGNIFICANT CHANGE UP (ref 135–145)
SPECIMEN SOURCE: SIGNIFICANT CHANGE UP
SPECIMEN SOURCE: SIGNIFICANT CHANGE UP
WBC # BLD: 12.31 K/UL — HIGH (ref 3.8–10.5)
WBC # FLD AUTO: 12.31 K/UL — HIGH (ref 3.8–10.5)

## 2018-10-14 PROCEDURE — 99233 SBSQ HOSP IP/OBS HIGH 50: CPT | Mod: GC

## 2018-10-14 RX ADMIN — PYRAZINAMIDE 1500 MILLIGRAM(S): 500 TABLET ORAL at 12:56

## 2018-10-14 RX ADMIN — Medication 1 TABLET(S): at 12:56

## 2018-10-14 RX ADMIN — Medication 300 MILLIGRAM(S): at 12:56

## 2018-10-14 RX ADMIN — Medication 50 MILLIGRAM(S): at 12:57

## 2018-10-14 RX ADMIN — PANTOPRAZOLE SODIUM 40 MILLIGRAM(S): 20 TABLET, DELAYED RELEASE ORAL at 05:53

## 2018-10-14 RX ADMIN — PANTOPRAZOLE SODIUM 40 MILLIGRAM(S): 20 TABLET, DELAYED RELEASE ORAL at 17:24

## 2018-10-14 RX ADMIN — ETHAMBUTOL HYDROCHLORIDE 1200 MILLIGRAM(S): 400 TABLET, FILM COATED ORAL at 12:56

## 2018-10-14 NOTE — PROGRESS NOTE ADULT - PROBLEM SELECTOR PLAN 1
Patient with cavitary lesions in CT scan, with positive sputum cultures and positive TB PCR  - ID recs appreciated.   - C/w RIPE therapy.   - C/w daily vitamin b6 50mg daily  - family will need to be tested  - will consult ophtho tomorrow for baseline eye exam

## 2018-10-14 NOTE — PROGRESS NOTE ADULT - SUBJECTIVE AND OBJECTIVE BOX
Tabatha Gracie, PGY1   Contact/Pager - [phone] / [phone]      Patient is a 28y old  Male who presents with a chief complaint of long term cough and concerning CXR findings, admitted for TB rule out (13 Oct 2018 11:36)        SUBJECTIVE / OVERNIGHT EVENTS:      MEDICATIONS  (STANDING):  ethambutol 1200 milliGRAM(s) Oral daily  isoniazid 300 milliGRAM(s) Oral daily  multivitamin 1 Tablet(s) Oral daily  pantoprazole    Tablet 40 milliGRAM(s) Oral two times a day  pyrazinamide 1500 milliGRAM(s) Oral daily  pyridoxine 50 milliGRAM(s) Oral daily  rifampin 600 milliGRAM(s) Oral daily    MEDICATIONS  (PRN):  sodium chloride 3%  Inhalation 3 milliLiter(s) Inhalation every 8 hours PRN COUGH      Allergies    Advil (Swelling)  Motrin (Swelling)  Tylenol (Swelling)    Intolerances          Vital Signs Last 24 Hrs  T(C): 37.1 (14 Oct 2018 05:51), Max: 37.1 (13 Oct 2018 23:45)  T(F): 98.7 (14 Oct 2018 05:51), Max: 98.7 (13 Oct 2018 23:45)  HR: 86 (14 Oct 2018 05:51) (72 - 86)  BP: 122/66 (14 Oct 2018 05:51) (113/71 - 122/81)  BP(mean): --  RR: 18 (14 Oct 2018 05:51) (17 - 18)  SpO2: 98% (14 Oct 2018 05:51) (98% - 100%)  CAPILLARY BLOOD GLUCOSE        I&O's Summary        PHYSICAL EXAM:  GENERAL: NAD, well-developed  HEAD:  AT, NC  EYES: EOMI, PERRLA, conjunctiva and sclera clear  ENMT: Airway patent. MMM. Good dentition, no lesions.  NECK: Supple, No JVD  CHEST/LUNG: CTABL; No wheezing, rhonci, or rales  HEART: RRR; Normal S1, S2. No murmurs, rubs, or gallops  ABDOMEN: Soft, NT, ND; Bowel sounds present. No organomegaly  EXTREMITIES:  2+ Peripheral Pulses, No clubbing, cyanosis, or edema  PSYCH: AAOx3  NEUROLOGY: non-focal  SKIN: Warm, dry, intact; No rashes or lesions      LABS:                        10.0   12.31 )-----------( 334      ( 14 Oct 2018 06:04 )             31.5     10-13    135  |  101  |  13  ----------------------------<  85  5.3   |  23  |  0.92    Ca    8.6      13 Oct 2018 06:36  Phos  4.6     10-13  Mg     2.0     10-13    TPro  7.1  /  Alb  2.8<L>  /  TBili  0.6  /  DBili  x   /  AST  12  /  ALT  11  /  AlkPhos  58  10-13    LIVER FUNCTIONS - ( 13 Oct 2018 06:36 )  Alb: 2.8 g/dL / Pro: 7.1 g/dL / ALK PHOS: 58 u/L / ALT: 11 u/L / AST: 12 u/L / GGT: x                       Culture - Respiratory with Gram Stain (collected 12 Oct 2018 22:55)  Source: SPUTUM    Culture - Acid Fast - Sputum w/Smear (collected 12 Oct 2018 00:49)  Source: SPUTUM  Preliminary Report (12 Oct 2018 14:23):    MTB PCR:         DETECTED    ( REFERENCE RANGE: NOT DETECTED)    The Xpert MTB/RIF Assay (realtime PCR) does not    differentiate between the species of the MTB-complex (i.e.,    Mycobacterium tuberculosis, M. bovis, etc.). In addition,    culture must also be performed to confirm susceptibility    result and to determine if mycobacteria other than    tuberculosis complex (FERNANDEZ) are present.    Due to low prevalence of rifampin resistent TB in the [place] (1.9%) and the implications of rifampin resistance    for treatment, all MTB-complex starins determined to be    rifampin resistant must have the presence of rifampin    resistance confirmed by a reference laboratory. A positive    test does not necessarily indicate the presence of viable    organisms.    Thisassay is FDA cleared for sputum only.    REFAMPIN RESISTANCE:    NOT DETECTED    ( REFERENCE RANGE: NOT DETECTED)    Mutation for Rifampin resistance absent. Results must be    confirmed by culture based susceptibility testing.    RESULT CALLED TO / READ BACK:GURU RUVALCABA RN./Y    DATE / TIME CALLED: 10/12/18 1418    CALLED BY: TERRELL FLORES    RESULT CALLED TO / READ BACK: MAR MOE/Y    DATE / TIME CALLED: 10/12/18 1421    CALLED BY: TERRELL FLORES          RADIOLOGY & ADDITIONAL TESTS:    Imaging Personally Reviewed: YES    Consultant(s) Notes Reviewed: YES    Care Discussed with Consultants/Other Providers: YES Tabatha Gracie, PGY1   Contact/Pager - [phone] / [phone]      Patient is a 28y old  Male who presents with a chief complaint of long term cough and concerning CXR findings, admitted for TB rule out (13 Oct 2018 11:36)        SUBJECTIVE / OVERNIGHT EVENTS:  no events overnight  patient is feeling well this AM    MEDICATIONS  (STANDING):  ethambutol 1200 milliGRAM(s) Oral daily  isoniazid 300 milliGRAM(s) Oral daily  multivitamin 1 Tablet(s) Oral daily  pantoprazole    Tablet 40 milliGRAM(s) Oral two times a day  pyrazinamide 1500 milliGRAM(s) Oral daily  pyridoxine 50 milliGRAM(s) Oral daily  rifampin 600 milliGRAM(s) Oral daily    MEDICATIONS  (PRN):  sodium chloride 3%  Inhalation 3 milliLiter(s) Inhalation every 8 hours PRN COUGH      Allergies    Advil (Swelling)  Motrin (Swelling)  Tylenol (Swelling)    Intolerances          Vital Signs Last 24 Hrs  T(C): 37.1 (14 Oct 2018 05:51), Max: 37.1 (13 Oct 2018 23:45)  T(F): 98.7 (14 Oct 2018 05:51), Max: 98.7 (13 Oct 2018 23:45)  HR: 86 (14 Oct 2018 05:51) (72 - 86)  BP: 122/66 (14 Oct 2018 05:51) (113/71 - 122/81)  BP(mean): --  RR: 18 (14 Oct 2018 05:51) (17 - 18)  SpO2: 98% (14 Oct 2018 05:51) (98% - 100%)  CAPILLARY BLOOD GLUCOSE        I&O's Summary        PHYSICAL EXAM:  GENERAL: NAD, well-developed  HEAD:  AT, NC  EYES: EOMI, PERRLA, conjunctiva and sclera clear  ENMT: Airway patent. MMM. Good dentition, no lesions.  NECK: Supple, No JVD  CHEST/LUNG: CTABL; No wheezing, rhonci, or rales  HEART: RRR; Normal S1, S2. No murmurs, rubs, or gallops  ABDOMEN: Soft, NT, ND; Bowel sounds present. No organomegaly  EXTREMITIES:  2+ Peripheral Pulses, No clubbing, cyanosis, or edema  PSYCH: AAOx3  NEUROLOGY: non-focal  SKIN: Warm, dry, intact; No rashes or lesions      LABS:                        10.0   12.31 )-----------( 334      ( 14 Oct 2018 06:04 )             31.5     10-13    135  |  101  |  13  ----------------------------<  85  5.3   |  23  |  0.92    Ca    8.6      13 Oct 2018 06:36  Phos  4.6     10-13  Mg     2.0     10-13    TPro  7.1  /  Alb  2.8<L>  /  TBili  0.6  /  DBili  x   /  AST  12  /  ALT  11  /  AlkPhos  58  10-13    LIVER FUNCTIONS - ( 13 Oct 2018 06:36 )  Alb: 2.8 g/dL / Pro: 7.1 g/dL / ALK PHOS: 58 u/L / ALT: 11 u/L / AST: 12 u/L / GGT: x                       Culture - Respiratory with Gram Stain (collected 12 Oct 2018 22:55)  Source: SPUTUM    Culture - Acid Fast - Sputum w/Smear (collected 12 Oct 2018 00:49)  Source: SPUTUM  Preliminary Report (12 Oct 2018 14:23):    MTB PCR:         DETECTED    ( REFERENCE RANGE: NOT DETECTED)    The Xpert MTB/RIF Assay (realtime PCR) does not    differentiate between the species of the MTB-complex (i.e.,    Mycobacterium tuberculosis, M. bovis, etc.). In addition,    culture must also be performed to confirm susceptibility    result and to determine if mycobacteria other than    tuberculosis complex (FERNANDEZ) are present.    Due to low prevalence of rifampin resistent TB in the [place]    [place] (1.9%) and the implications of rifampin resistance    for treatment, all MTB-complex starins determined to be    rifampin resistant must have the presence of rifampin    resistance confirmed by a reference laboratory. A positive    test does not necessarily indicate the presence of viable    organisms.    Thisassay is FDA cleared for sputum only.    REFAMPIN RESISTANCE:    NOT DETECTED    ( REFERENCE RANGE: NOT DETECTED)    Mutation for Rifampin resistance absent. Results must be    confirmed by culture based susceptibility testing.    RESULT CALLED TO / READ BACK:GURU RUVALCABA RN./Y    DATE / TIME CALLED: 10/12/18 1418    CALLED BY: TERRELL FLORES    RESULT CALLED TO / READ BACK: MAR MOE  ICP/Y    DATE / TIME CALLED: 10/12/18 1421    CALLED BY: TERRELL FLORES          RADIOLOGY & ADDITIONAL TESTS:    Imaging Personally Reviewed: YES    Consultant(s) Notes Reviewed: YES    Care Discussed with Consultants/Other Providers: YES

## 2018-10-14 NOTE — PROGRESS NOTE ADULT - ASSESSMENT
28 year old Ethiopian male with PMHx of GERD presenting to ED after X-ray performed by GI for chronic cough demonstrated multifocal consolidations, with admission for rule out of TB.

## 2018-10-14 NOTE — PROGRESS NOTE ADULT - ATTENDING COMMENTS
Pt seen and examined with HS on above date.  Agree with above HS note.  Plan discussed with House staff.    28 M with multifocal pna, cough, fevers, anemia of chronic disease and malnutrition. He's originally from [place] but has been in the [place] since he was a teenager, denies weight loss/drenching night sweats.     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

## 2018-10-15 ENCOUNTER — TRANSCRIPTION ENCOUNTER (OUTPATIENT)
Age: 28
End: 2018-10-15

## 2018-10-15 LAB
BACTERIA BLD CULT: SIGNIFICANT CHANGE UP
BACTERIA BLD CULT: SIGNIFICANT CHANGE UP
BACTERIA SPT RESP CULT: SIGNIFICANT CHANGE UP

## 2018-10-15 PROCEDURE — 99232 SBSQ HOSP IP/OBS MODERATE 35: CPT

## 2018-10-15 PROCEDURE — 99233 SBSQ HOSP IP/OBS HIGH 50: CPT | Mod: GC

## 2018-10-15 RX ADMIN — PANTOPRAZOLE SODIUM 40 MILLIGRAM(S): 20 TABLET, DELAYED RELEASE ORAL at 17:51

## 2018-10-15 RX ADMIN — Medication 300 MILLIGRAM(S): at 13:15

## 2018-10-15 RX ADMIN — PANTOPRAZOLE SODIUM 40 MILLIGRAM(S): 20 TABLET, DELAYED RELEASE ORAL at 06:33

## 2018-10-15 RX ADMIN — ETHAMBUTOL HYDROCHLORIDE 1200 MILLIGRAM(S): 400 TABLET, FILM COATED ORAL at 13:14

## 2018-10-15 RX ADMIN — PYRAZINAMIDE 1500 MILLIGRAM(S): 500 TABLET ORAL at 13:15

## 2018-10-15 RX ADMIN — Medication 50 MILLIGRAM(S): at 13:14

## 2018-10-15 RX ADMIN — Medication 1 TABLET(S): at 13:15

## 2018-10-15 NOTE — DISCHARGE NOTE ADULT - PATIENT PORTAL LINK FT
You can access the [facility] Patient Portal, offered by [facility], by registering with the following website: [email or web address]

## 2018-10-15 NOTE — PROGRESS NOTE ADULT - ASSESSMENT
28 year old Maldivian male with PMHx of GERD presenting to ED after X-ray performed by GI for chronic cough demonstrated multifocal consolidations, with admission for rule out of TB.

## 2018-10-15 NOTE — PROGRESS NOTE ADULT - SUBJECTIVE AND OBJECTIVE BOX
Follow Up:  pulmonary TB    Inverval History/ROS: feels a bit better.  appetite OK.  no fever, chills.    Allergies  Advil (Swelling)  Motrin (Swelling)  Tylenol (Swelling)        ANTIMICROBIALS:  ethambutol 1200 daily  isoniazid 300 daily  pyrazinamide 1500 daily  rifampin 600 daily      OTHER MEDS:  multivitamin 1 Tablet(s) Oral daily  pantoprazole    Tablet 40 milliGRAM(s) Oral two times a day  pyridoxine 50 milliGRAM(s) Oral daily      Vital Signs Last 24 Hrs  T(C): 36.4 (15 Oct 2018 14:52), Max: 37.4 (14 Oct 2018 20:58)  T(F): 97.6 (15 Oct 2018 14:52), Max: 99.4 (14 Oct 2018 20:58)  HR: 65 (15 Oct 2018 14:52) (65 - 86)  BP: 108/67 (15 Oct 2018 14:52) (104/72 - 113/77)  BP(mean): --  RR: 18 (15 Oct 2018 14:52) (17 - 18)  SpO2: 100% (15 Oct 2018 14:52) (98% - 100%)    PHYSICAL EXAM:  General: [x ] non-toxic  HEAD/EYES: [ ] PERRL [x ] white sclera [ ] icterus  ENT:  [ ] normal [x ] supple [ ] thrush [ ] pharyngeal exudate  Cardiovascular:   [ ] murmur [x ] normal [ ] PPM/AICD  Respiratory:  [x ] course breath sounds bilaterally  GI:  x[ ] soft, non-tender, normal bowel sounds  :  [ ] schuler [x ] no CVA tenderness   Musculoskeletal:  [x ] no synovitis  Neurologic:  [x ] non-focal exam   Skin:  [x ] no rash  Lymph: [x ] no lymphadenopathy  Psychiatric:  [x ] appropriate affect [x ] alert & oriented  Lines:  [ ] no phlebitis [ ] central line                                10.0   12.31 )-----------( 334      ( 14 Oct 2018 06:04 )             31.5       10-14    136  |  99  |  13  ----------------------------<  91  4.4   |  23  |  0.90    Ca    8.3<L>      14 Oct 2018 06:04  Phos  4.8     10-14  Mg     1.9     10-14    TPro  7.4  /  Alb  2.9<L>  /  TBili  0.6  /  DBili  x   /  AST  14  /  ALT  13  /  AlkPhos  59  10-14          MICROBIOLOGY:  v  SPUTUM  10-14-18 --  --  --AFB 3 +      SPUTUM  10-13-18 --  --  --AFB 3 +      SPUTUM  10-12-18 --  --  --   AFB 4+      SPUTUM  10-12-18 --  --  --      SPUTUM  10-10-18 --  --  --      BLOOD  10-10-18 --  --  --          RADIOLOGY:

## 2018-10-15 NOTE — DISCHARGE NOTE ADULT - HOSPITAL COURSE
28 year old male with GERD presenting with 6 months of cough and intermittent fevers found to have Tuberculosis after 4 positive sputum cultures and positive PCR, with CT findings of cavitary lesions. The patient was started on RIPE therapy in the hospital, and stayed hospitalized until he was cleared for safe return to his home by Department of Health. 28 year old male with GERD presenting with 6 months of cough and intermittent fevers found to have Tuberculosis after 4 positive sputum cultures and positive PCR, with CT findings of cavitary lesions. The patient was started on RIPE therapy in the hospital, and stayed hospitalized until he was cleared for safe return to his home by Department of Health. He had an ophthalmologic exam which showed baseline eye function wnl. 28 year old male with GERD presenting with 6 months of cough and intermittent fevers found to have Tuberculosis after 4 positive sputum cultures and positive PCR, with CT findings of cavitary lesions. The patient was started on RIPE therapy in the hospital, and stayed hospitalized until he was cleared for safe return to his home by Department of Health. He had an ophthalmologic exam which showed baseline eye function wnl.   Pt has been on TB medication for more than 2 weeks and acheived sputum AFB smear neg x 3. Pt is medically stable.  Pt is ok to dc home with 3 week supply of TB meds.  [facility] will do DOT on his treatment   Please follow up with [facility] TB clinic on 11/13/2018

## 2018-10-15 NOTE — DISCHARGE NOTE ADULT - ADDITIONAL INSTRUCTIONS
You have an appointment with [facility] at  11/13/2018 at 5PM (please arrive 15 minutes prior)  [place] [address]  Tel. [phone]  :   [place] Tel. [phone] You have an appointment with [facility] at  11/13/2018 at 5PM (please arrive 15 minutes prior)  [place] [address]  Tel. [phone]  :   [place] Tel. [phone]  Please follow up with your pcp within 1 week of discharge.    please call to make an appointment within 1 week of discharge.

## 2018-10-15 NOTE — DISCHARGE NOTE ADULT - CARE PLAN
Principal Discharge DX:	Tuberculosis  Goal:	continue RIPE therapy and follow up with ID  Assessment and plan of treatment:	You were diagnosed with tuberculosis during this hospitalization. The treatment for this is very intensive and given the contagious nature of this disease, you required long term hospitalization to make ensure it was no longer contagious. You will need to continue your tuberculosis treatment and follow up with infectious disease.  Secondary Diagnosis:	GERD (gastroesophageal reflux disease)  Goal:	Continue taking omeprazole and follow up with GI  Assessment and plan of treatment:	You can continue taking the omeprazole you had received from home and follow up with your GI doctor Principal Discharge DX:	Tuberculosis  Goal:	continue RIPE therapy and follow up with ID  Assessment and plan of treatment:	You were diagnosed with tuberculosis during this hospitalization. The treatment for this is very intensive and given the contagious nature of this disease, you required long term hospitalization to make ensure it was no longer contagious. You will need to continue your tuberculosis treatment and follow up with infectious disease.  You have an appointment with [facility] at  11/13/2018 at 5PM (please arrive 15 minutes prior)  [place] [address]  Tel. [phone]  :   [place] Tel. [phone]  Please follow up with your pcp within 1 week of discharge.  please call to make an appointment within 1 week of discharge.  Secondary Diagnosis:	GERD (gastroesophageal reflux disease)  Goal:	Continue taking omeprazole and follow up with GI  Assessment and plan of treatment:	You can continue taking the omeprazole you had received from home and follow up with your GI doctor.  please call to make an appointment within 1 week of discharge.

## 2018-10-15 NOTE — PROGRESS NOTE ADULT - PROBLEM SELECTOR PLAN 1
Patient with cavitary lesions in CT scan, with positive sputum cultures and positive TB PCR  - ID recs appreciated.   - C/w RIPE therapy.   - C/w daily vitamin b6 50mg daily  - family will need to be tested  - will consult ophtho today for baseline eye exam   -f/u ID recs about abx course and need for airborne precautions

## 2018-10-15 NOTE — DISCHARGE NOTE ADULT - PLAN OF CARE
continue RIPE therapy and follow up with ID You were diagnosed with tuberculosis during this hospitalization. The treatment for this is very intensive and given the contagious nature of this disease, you required long term hospitalization to make ensure it was no longer contagious. You will need to continue your tuberculosis treatment and follow up with infectious disease. Continue taking omeprazole and follow up with GI You can continue taking the omeprazole you had received from home and follow up with your GI doctor You were diagnosed with tuberculosis during this hospitalization. The treatment for this is very intensive and given the contagious nature of this disease, you required long term hospitalization to make ensure it was no longer contagious. You will need to continue your tuberculosis treatment and follow up with infectious disease.  You have an appointment with [facility] at  11/13/2018 at 5PM (please arrive 15 minutes prior)  [place] [address]  Tel. [phone]  :   [place] Tel. [phone]  Please follow up with your pcp within 1 week of discharge.  please call to make an appointment within 1 week of discharge. You can continue taking the omeprazole you had received from home and follow up with your GI doctor.  please call to make an appointment within 1 week of discharge.

## 2018-10-15 NOTE — PROGRESS NOTE ADULT - SUBJECTIVE AND OBJECTIVE BOX
Tabatha Bowman, PGY1   Contact/Pager - [phone] / [phone]      Patient is a 28y old  Male who presents with a chief complaint of long term cough and concerning CXR findings, admitted for TB rule out (14 Oct 2018 07:11)        SUBJECTIVE / OVERNIGHT EVENTS:  no events overnight, patient feels well this AM      MEDICATIONS  (STANDING):  ethambutol 1200 milliGRAM(s) Oral daily  isoniazid 300 milliGRAM(s) Oral daily  multivitamin 1 Tablet(s) Oral daily  pantoprazole    Tablet 40 milliGRAM(s) Oral two times a day  pyrazinamide 1500 milliGRAM(s) Oral daily  pyridoxine 50 milliGRAM(s) Oral daily  rifampin 600 milliGRAM(s) Oral daily    MEDICATIONS  (PRN):      Allergies    Advil (Swelling)  Motrin (Swelling)  Tylenol (Swelling)    Intolerances          Vital Signs Last 24 Hrs  T(C): 36.9 (15 Oct 2018 06:31), Max: 37.4 (14 Oct 2018 20:58)  T(F): 98.4 (15 Oct 2018 06:31), Max: 99.4 (14 Oct 2018 20:58)  HR: 86 (15 Oct 2018 06:31) (65 - 86)  BP: 104/72 (15 Oct 2018 06:31) (104/72 - 113/77)  BP(mean): --  RR: 18 (15 Oct 2018 06:31) (17 - 18)  SpO2: 98% (15 Oct 2018 06:31) (98% - 100%)  CAPILLARY BLOOD GLUCOSE        I&O's Summary        PHYSICAL EXAM:  GENERAL: NAD, well-developed  HEAD:  AT, NC  EYES: EOMI, PERRLA, conjunctiva and sclera clear  ENMT: Airway patent. MMM. Good dentition, no lesions.  NECK: Supple, No JVD  CHEST/LUNG: CTABL; No wheezing, rhonci, or rales  HEART: RRR; Normal S1, S2. No murmurs, rubs, or gallops  ABDOMEN: Soft, NT, ND; Bowel sounds present. No organomegaly  EXTREMITIES:  2+ Peripheral Pulses, No clubbing, cyanosis, or edema  PSYCH: AAOx3  NEUROLOGY: non-focal  SKIN: Warm, dry, intact; No rashes or lesions      LABS:                        10.0   12.31 )-----------( 334      ( 14 Oct 2018 06:04 )             31.5     10-14    136  |  99  |  13  ----------------------------<  91  4.4   |  23  |  0.90    Ca    8.3<L>      14 Oct 2018 06:04  Phos  4.8     10-14  Mg     1.9     10-14    TPro  7.4  /  Alb  2.9<L>  /  TBili  0.6  /  DBili  x   /  AST  14  /  ALT  13  /  AlkPhos  59  10-14    LIVER FUNCTIONS - ( 14 Oct 2018 06:04 )  Alb: 2.9 g/dL / Pro: 7.4 g/dL / ALK PHOS: 59 u/L / ALT: 13 u/L / AST: 14 u/L / GGT: x                       Culture - Acid Fast - Sputum w/Smear (collected 14 Oct 2018 11:18)  Source: SPUTUM  Final Report (14 Oct 2018 15:20):    ***** CRITICAL RESULT *****    PERSON CALLED / READ-BACK:PRINCESS ALVARADO RN/Y    DATE / TIME CALLED: 10/14/18 1520    CALLED BY: PARISA LAZO    AFB^Acid Fast Bacilli    QNTY AFB BY FLUOR. STAIN: (3+) MODERATE    QNTY AFB BY KINYOUN STAIN: (2+) FEW    Culture - Acid Fast - Sputum w/Smear (collected 13 Oct 2018 17:19)  Source: SPUTUM  Final Report (14 Oct 2018 15:19):    ***** CRITICAL RESULT *****    PERSON CALLED / READ-BACK:PRINCESS ALVARADO RN/Y    DATE / TIME CALLED: 10/14/18 1517    CALLED BY: PARISA LAZO    AFB^Acid Fast Bacilli    QNTY AFB BY FLUOR. STAIN: (3+) MODERATE    QNTY AFB BY KINYOUN STAIN: (2+) FEW    Culture - Respiratory with Gram Stain (collected 12 Oct 2018 22:55)  Source: SPUTUM  Preliminary Report (14 Oct 2018 08:19):    NRF^Normal Respiratory Zehra    QUANTITY OF GROWTH: FEW          RADIOLOGY & ADDITIONAL TESTS:    Imaging Personally Reviewed: YES    Consultant(s) Notes Reviewed: YES    Care Discussed with Consultants/Other Providers: YES

## 2018-10-15 NOTE — DISCHARGE NOTE ADULT - MEDICATION SUMMARY - MEDICATIONS TO TAKE
I will START or STAY ON the medications listed below when I get home from the hospital:    rifAMPin 300 mg oral capsule  -- 2 cap(s) by mouth once a day  -- Indication: For Tuberculosis    ethambutol 400 mg oral tablet  -- 3 tab(s) by mouth once a day  -- Indication: For Tuberculosis    isoniazid 300 mg oral tablet  -- 1 tab(s) by mouth once a day  -- Indication: For Tuberculosis    pyrazinamide 500 mg oral tablet  -- 3 tab(s) by mouth once a day  -- Indication: For Tuberculosis    pantoprazole 40 mg oral delayed release tablet  -- 1 tab(s) by mouth once a day  -- Indication: For GERD (gastroesophageal reflux disease)    Multiple Vitamins oral tablet  -- 1 tab(s) by mouth once a day  -- Indication: For supplement    pyridoxine 50 mg oral tablet  -- 1 tab(s) by mouth once a day  -- Indication: For Tuberculosis

## 2018-10-15 NOTE — PROGRESS NOTE ADULT - ATTENDING COMMENTS
Pt seen and examined, chart and labs reviewed.  Pt currently comfortable, no acute complaints.  Pt agreeable with plan to stay in hospital until cleared by JORGE.  Sputum cultures persistently growing AFB, will plan to check next AFB at the end of the week.  Currently tolerating RIPE therapy, on Vit B6.

## 2018-10-15 NOTE — PROGRESS NOTE ADULT - ASSESSMENT
Pulmonary cavitary TB.  Tolerating RIPE.    Suggest: would defer sending additional AFB smear/stain until 10/22                continue RIPE                airborne precautions

## 2018-10-16 PROCEDURE — 99232 SBSQ HOSP IP/OBS MODERATE 35: CPT | Mod: GC

## 2018-10-16 RX ADMIN — ETHAMBUTOL HYDROCHLORIDE 1200 MILLIGRAM(S): 400 TABLET, FILM COATED ORAL at 13:01

## 2018-10-16 RX ADMIN — Medication 50 MILLIGRAM(S): at 13:01

## 2018-10-16 RX ADMIN — Medication 300 MILLIGRAM(S): at 13:01

## 2018-10-16 RX ADMIN — PYRAZINAMIDE 1500 MILLIGRAM(S): 500 TABLET ORAL at 13:01

## 2018-10-16 RX ADMIN — PANTOPRAZOLE SODIUM 40 MILLIGRAM(S): 20 TABLET, DELAYED RELEASE ORAL at 17:46

## 2018-10-16 RX ADMIN — Medication 1 TABLET(S): at 13:01

## 2018-10-16 RX ADMIN — PANTOPRAZOLE SODIUM 40 MILLIGRAM(S): 20 TABLET, DELAYED RELEASE ORAL at 05:17

## 2018-10-16 NOTE — PROGRESS NOTE ADULT - ASSESSMENT
28 year old Beninese male with PMHx of GERD presenting to ED after X-ray performed by GI for chronic cough demonstrated multifocal consolidations, with admission for rule out of TB.

## 2018-10-16 NOTE — CONSULT NOTE ADULT - ATTENDING COMMENTS
I have reviewed the residents note including the history, exam, assessment, and plan.  I agree with the residents assessment and plan.    Kathryn Ley MD

## 2018-10-16 NOTE — CONSULT NOTE ADULT - REASON FOR ADMISSION
long term cough and concerning CXR findings, admitted for TB rule out
long term cough and concerning CXR findings, admitted for TB rule out

## 2018-10-16 NOTE — CONSULT NOTE ADULT - SUBJECTIVE AND OBJECTIVE BOX
[facility] Ophthalmology Consult Note     HPI: 29yo M with history of GERD presenting to ED after X-ray performed by GI for chronic cough demonstrated multifocal consolidations, found to have cavitary lesions on CT scan. Sputum culture and PCR positive for TB, started on RIPE therapy. Ophtho consulted for baseline eye exam. No complaints of any vision changes, diplopia, flashes/floaters.    PMH: GERD  Meds: None  POcHx (including surgeries/lasers/trauma):  None  Drops: None  FamHx: None  Social Hx: None  Allergies: advil, motrin, tylenol    ROS:  General (neg), Vision (per HPI), Head and Neck (neg), Pulm (neg), CV (neg), GI (neg),  (neg), Musculoskeletal (neg), Skin/Integ (neg), Neuro (neg), Endocrine (neg), Heme (neg), All/Immuno (neg)    Mood and Affect Appropriate ( x ),  Oriented to Time, Place, and Person x 3 ( x )    Ophthalmology Exam    Visual acuity near sc: 20/20 OU  Pupils: PERRL OU, no APD  Ttono: 16 OU  Extraocular movements (EOMs): Full OU, no pain, no diplopia   Confrontational Visual Field (CVF):  Full OU  Color Plates: 12/12 OU    Pen Light Exam (PLE)  External:  Flat OU  Lids/Lashes/Lacrimal Ducts: Flat OU    Sclera/Conjunctiva:  W+Q OU  Cornea: Cl OU  Anterior Chamber: D+F OU  Iris:  Flat OU  Lens:  Cl OU    Fundus Exam: dilated with 1% tropicamide and 2.5% phenylephrine @   Approval obtained from primary team for dilation  Patient aware that pupils can remained dilated for at least 4-6 hours  Exam performed with 20D lens    Vitreous: wnl OU  Cup/Disc: 0.4 OU  Macula:  wnl OU  Vessels:  wnl OU  Periphery: wnl OU    Diagnostic Testing:  CT chest: Diffuse, upper lobe predominant thick-walled cavitary lesions,   tree-in-bud opacities, and nodules. Given the clinical circumstances,   primary differential consideration is tuberculosis. Follow-up resolution   is recommended.    Assessment: 29yo M with history of GERD presenting to ED after X-ray performed by GI for chronic cough demonstrated multifocal consolidations, found to have cavitary lesions on CT scan. Sputum culture and PCR positive for TB, started on RIPE therapy. Eye exam     Plan:  -       Follow-Up:  Patient should follow up in the [facility]hthalmology Practice within 1 week of discharge.  [address] [place] [address]  [phone] (practice) or [phone] (clinic) [facility] Ophthalmology Consult Note     HPI: 29yo M with history of GERD presenting to ED after X-ray performed by GI for chronic cough demonstrated multifocal consolidations, found to have cavitary lesions on CT scan. Sputum culture and PCR positive for TB, started on RIPE therapy. Ophtho consulted for baseline eye exam. No complaints of any vision changes, diplopia, flashes/floaters.    PMH: GERD  Meds: None  POcHx (including surgeries/lasers/trauma):  None  Drops: None  FamHx: None  Social Hx: None  Allergies: advil, motrin, tylenol    ROS:  General (neg), Vision (per HPI), Head and Neck (neg), Pulm (neg), CV (neg), GI (neg),  (neg), Musculoskeletal (neg), Skin/Integ (neg), Neuro (neg), Endocrine (neg), Heme (neg), All/Immuno (neg)    Mood and Affect Appropriate ( x ),  Oriented to Time, Place, and Person x 3 ( x )    Ophthalmology Exam    Visual acuity near sc: 20/20 OU  Pupils: PERRL OU, no APD  Ttono: 16 OU  Extraocular movements (EOMs): Full OU, no pain, no diplopia   Confrontational Visual Field (CVF):  Full OU  Color Plates: 12/12 OU    Pen Light Exam (PLE)  External:  Flat OU  Lids/Lashes/Lacrimal Ducts: Flat OU    Sclera/Conjunctiva:  W+Q OU  Cornea: Cl OU  Anterior Chamber: D+F OU  Iris:  Flat OU  Lens:  Cl OU    Fundus Exam: dilated with 1% tropicamide and 2.5% phenylephrine @   Approval obtained from primary team for dilation  Patient aware that pupils can remained dilated for at least 4-6 hours  Exam performed with 20D lens    Vitreous: wnl OU  Cup/Disc: pink and sharp 0.3 OU  Macula:  wnl OU  Vessels:  wnl OU  Periphery: wnl OU    Diagnostic Testing:  CT chest: Diffuse, upper lobe predominant thick-walled cavitary lesions,   tree-in-bud opacities, and nodules. Given the clinical circumstances,   primary differential consideration is tuberculosis. Follow-up resolution   is recommended.    Assessment: 29yo M with history of GERD presenting to ED after X-ray performed by GI for chronic cough demonstrated multifocal consolidations, found to have cavitary lesions on CT scan. Sputum culture and PCR positive for TB, started on RIPE therapy. Eye exam wnl    Plan:  - rest of care and work up per primary team  - follow up at our eye clinic or with an ophthalmologist within 1wk of discharge for formal HVF testing and further monitoring  - re-page if pt endorses vision changes      Follow-Up:  Patient should follow up in the [facility] Ophthalmology Practice within 1 week of discharge.  [address] [place] [address]  [phone] (practice) or [phone] (clinic)

## 2018-10-16 NOTE — PROGRESS NOTE ADULT - SUBJECTIVE AND OBJECTIVE BOX
Tabatha Bowman, PGY1   Contact/Pager - [phone] / [phone]      Patient is a 28y old  Male who presents with a chief complaint of long term cough and concerning CXR findings, admitted for TB rule out (15 Oct 2018 19:09)        SUBJECTIVE / OVERNIGHT EVENTS:      MEDICATIONS  (STANDING):  ethambutol 1200 milliGRAM(s) Oral daily  isoniazid 300 milliGRAM(s) Oral daily  multivitamin 1 Tablet(s) Oral daily  pantoprazole    Tablet 40 milliGRAM(s) Oral two times a day  pyrazinamide 1500 milliGRAM(s) Oral daily  pyridoxine 50 milliGRAM(s) Oral daily  rifampin 600 milliGRAM(s) Oral daily    MEDICATIONS  (PRN):      Allergies    Advil (Swelling)  Motrin (Swelling)  Tylenol (Swelling)    Intolerances          Vital Signs Last 24 Hrs  T(C): 37.1 (16 Oct 2018 05:38), Max: 37.1 (16 Oct 2018 05:38)  T(F): 98.8 (16 Oct 2018 05:38), Max: 98.8 (16 Oct 2018 05:38)  HR: 77 (16 Oct 2018 05:38) (65 - 77)  BP: 106/68 (16 Oct 2018 05:38) (106/68 - 111/73)  BP(mean): --  RR: 16 (16 Oct 2018 05:38) (16 - 18)  SpO2: 99% (16 Oct 2018 05:38) (99% - 100%)  CAPILLARY BLOOD GLUCOSE        I&O's Summary        PHYSICAL EXAM:  GENERAL: NAD, well-developed  HEAD:  AT, NC  EYES: EOMI, PERRLA, conjunctiva and sclera clear  ENMT: Airway patent. MMM. Good dentition, no lesions.  NECK: Supple, No JVD  CHEST/LUNG: CTABL; No wheezing, rhonci, or rales  HEART: RRR; Normal S1, S2. No murmurs, rubs, or gallops  ABDOMEN: Soft, NT, ND; Bowel sounds present. No organomegaly  EXTREMITIES:  2+ Peripheral Pulses, No clubbing, cyanosis, or edema  PSYCH: AAOx3  NEUROLOGY: non-focal  SKIN: Warm, dry, intact; No rashes or lesions      LABS:                        Culture - Acid Fast - Sputum w/Smear (collected 14 Oct 2018 11:18)  Source: SPUTUM  Final Report (14 Oct 2018 15:20):    ***** CRITICAL RESULT *****    PERSON CALLED / READ-BACK:PRINCESS ALVARADO RN/Y    DATE / TIME CALLED: 10/14/18 1520    CALLED BY: PARISA LAZO    AFB^Acid Fast Bacilli    QNTY AFB BY FLUOR. STAIN: (3+) MODERATE    QNTY AFB BY KINYOUN STAIN: (2+) FEW    Culture - Acid Fast - Sputum w/Smear (collected 13 Oct 2018 17:19)  Source: SPUTUM  Final Report (14 Oct 2018 15:19):    ***** CRITICAL RESULT *****    PERSON CALLED / READ-BACK:PRINCESS ALVARADO RN/Y    DATE / TIME CALLED: 10/14/18 1517    CALLED BY: PARISA LAZO    AFB^Acid Fast Bacilli    QNTY AFB BY FLUOR. STAIN: (3+) MODERATE    QNTY AFB BY KINYOUN STAIN: (2+) FEW          RADIOLOGY & ADDITIONAL TESTS:    Imaging Personally Reviewed: YES    Consultant(s) Notes Reviewed: YES    Care Discussed with Consultants/Other Providers: YES Tabatha Bowman, PGY1   Contact/Pager - [phone] / [phone]      Patient is a 28y old  Male who presents with a chief complaint of long term cough and concerning CXR findings, admitted for TB rule out (15 Oct 2018 19:09)        SUBJECTIVE / OVERNIGHT EVENTS:  no events overnight      MEDICATIONS  (STANDING):  ethambutol 1200 milliGRAM(s) Oral daily  isoniazid 300 milliGRAM(s) Oral daily  multivitamin 1 Tablet(s) Oral daily  pantoprazole    Tablet 40 milliGRAM(s) Oral two times a day  pyrazinamide 1500 milliGRAM(s) Oral daily  pyridoxine 50 milliGRAM(s) Oral daily  rifampin 600 milliGRAM(s) Oral daily    MEDICATIONS  (PRN):      Allergies    Advil (Swelling)  Motrin (Swelling)  Tylenol (Swelling)    Intolerances          Vital Signs Last 24 Hrs  T(C): 37.1 (16 Oct 2018 05:38), Max: 37.1 (16 Oct 2018 05:38)  T(F): 98.8 (16 Oct 2018 05:38), Max: 98.8 (16 Oct 2018 05:38)  HR: 77 (16 Oct 2018 05:38) (65 - 77)  BP: 106/68 (16 Oct 2018 05:38) (106/68 - 111/73)  BP(mean): --  RR: 16 (16 Oct 2018 05:38) (16 - 18)  SpO2: 99% (16 Oct 2018 05:38) (99% - 100%)  CAPILLARY BLOOD GLUCOSE        I&O's Summary        PHYSICAL EXAM:  GENERAL: NAD, well-developed  HEAD:  AT, NC  EYES: EOMI, PERRLA, conjunctiva and sclera clear  ENMT: Airway patent. MMM. Good dentition, no lesions.  NECK: Supple, No JVD  CHEST/LUNG: CTABL; No wheezing, rhonci, or rales  HEART: RRR; Normal S1, S2. No murmurs, rubs, or gallops  ABDOMEN: Soft, NT, ND; Bowel sounds present. No organomegaly  EXTREMITIES:  2+ Peripheral Pulses, No clubbing, cyanosis, or edema  PSYCH: AAOx3  NEUROLOGY: non-focal  SKIN: Warm, dry, intact; No rashes or lesions      LABS:                        Culture - Acid Fast - Sputum w/Smear (collected 14 Oct 2018 11:18)  Source: SPUTUM  Final Report (14 Oct 2018 15:20):    ***** CRITICAL RESULT *****    PERSON CALLED / READ-BACK:PRINCESS ALVARADO RN/Y    DATE / TIME CALLED: 10/14/18 1520    CALLED BY: PARISA LAZO    AFB^Acid Fast Bacilli    QNTY AFB BY FLUOR. STAIN: (3+) MODERATE    QNTY AFB BY KINYOUN STAIN: (2+) FEW    Culture - Acid Fast - Sputum w/Smear (collected 13 Oct 2018 17:19)  Source: SPUTUM  Final Report (14 Oct 2018 15:19):    ***** CRITICAL RESULT *****    PERSON CALLED / READ-BACK:PRINCESS ALVARADO RN/Y    DATE / TIME CALLED: 10/14/18 1517    CALLED BY: PARISA LAZO    AFB^Acid Fast Bacilli    QNTY AFB BY FLUOR. STAIN: (3+) MODERATE    QNTY AFB BY KINYOUN STAIN: (2+) FEW          RADIOLOGY & ADDITIONAL TESTS:    Imaging Personally Reviewed: YES    Consultant(s) Notes Reviewed: YES    Care Discussed with Consultants/Other Providers: YES

## 2018-10-16 NOTE — PROGRESS NOTE ADULT - PROBLEM SELECTOR PLAN 1
Patient with cavitary lesions in CT scan, with positive sputum cultures and positive TB PCR  - ID recs appreciated.   - C/w RIPE therapy.   - C/w daily vitamin b6 50mg daily  - family will need to be tested  - will consult ophtho today for baseline eye exam   -f/u ID recs about abx course and need for airborne precautions Patient with cavitary lesions in CT scan, with positive sputum cultures and positive TB PCR  - ID recs appreciated.   - C/w RIPE therapy - will likely need to be hospitalized for weeks to come, will need JORGE clearance before returning home  - C/w daily vitamin b6 50mg daily  - family will need to be tested  - f/u baseline eye exam

## 2018-10-17 LAB
GAMMA INTERFERON BACKGROUND BLD IA-ACNC: SIGNIFICANT CHANGE UP
M TB IFN-G BLD-IMP: NEGATIVE — SIGNIFICANT CHANGE UP
M TB IFN-G CD4+ BCKGRND COR BLD-ACNC: SIGNIFICANT CHANGE UP
M TB IFN-G CD4+CD8+ BCKGRND COR BLD-ACNC: SIGNIFICANT CHANGE UP
QUANT TB PLUS MITOGEN MINUS NIL: SIGNIFICANT CHANGE UP

## 2018-10-17 PROCEDURE — 99232 SBSQ HOSP IP/OBS MODERATE 35: CPT | Mod: GC

## 2018-10-17 RX ADMIN — ETHAMBUTOL HYDROCHLORIDE 1200 MILLIGRAM(S): 400 TABLET, FILM COATED ORAL at 13:37

## 2018-10-17 RX ADMIN — PANTOPRAZOLE SODIUM 40 MILLIGRAM(S): 20 TABLET, DELAYED RELEASE ORAL at 05:34

## 2018-10-17 RX ADMIN — Medication 50 MILLIGRAM(S): at 13:38

## 2018-10-17 RX ADMIN — PYRAZINAMIDE 1500 MILLIGRAM(S): 500 TABLET ORAL at 14:20

## 2018-10-17 RX ADMIN — Medication 300 MILLIGRAM(S): at 13:37

## 2018-10-17 RX ADMIN — Medication 1 TABLET(S): at 13:37

## 2018-10-17 RX ADMIN — PANTOPRAZOLE SODIUM 40 MILLIGRAM(S): 20 TABLET, DELAYED RELEASE ORAL at 18:43

## 2018-10-17 NOTE — PROGRESS NOTE ADULT - ATTENDING COMMENTS
Pt seen and examined, pt resting comfortably, has no questions and fully understands that he is here for several weeks until JORGE clearance.  Plan for repeat AFB on Monday 10/22.

## 2018-10-17 NOTE — PROGRESS NOTE ADULT - PROBLEM SELECTOR PLAN 1
Patient with cavitary lesions in CT scan, with positive sputum cultures and positive TB PCR  - ID recs appreciated.   - C/w RIPE therapy - will likely need to be hospitalized for weeks to come, will need JORGE clearance before returning home  - C/w daily vitamin b6 50mg daily  - family will need to be tested  - f/u baseline eye exam Patient with cavitary lesions in CT scan, with positive sputum cultures and positive TB PCR  - ID recs appreciated.   - C/w RIPE therapy - will likely need to be hospitalized for weeks to come, will need JORGE clearance before returning home  - C/w daily vitamin b6 50mg daily  - family will need to be tested  -ophtho consult appreciated - normal baseline eye exam (ethambutol tox) Patient with cavitary lesions in CT scan, with positive sputum cultures and positive TB PCR  - ID recs appreciated. - C/w RIPE therapy - will likely need to be hospitalized for weeks to come, will need JORGE clearance before returning home  - C/w daily vitamin b6 50mg daily  -no more sputum cx till 10/22  - family will need to be tested  -ophtho consult appreciated - normal baseline eye exam (ethambutol tox)

## 2018-10-17 NOTE — PROGRESS NOTE ADULT - ASSESSMENT
28 year old Solomon Islander male with PMHx of GERD presenting to ED after X-ray performed by GI for chronic cough demonstrated multifocal consolidations, with admission for rule out of TB.

## 2018-10-17 NOTE — PROGRESS NOTE ADULT - SUBJECTIVE AND OBJECTIVE BOX
Tabatha Bowman, PGY1   Contact/Pager - [phone] / [phone]      Patient is a 28y old  Male who presents with a chief complaint of long term cough and concerning CXR findings, admitted for TB rule out (16 Oct 2018 09:53)        SUBJECTIVE / OVERNIGHT EVENTS:      MEDICATIONS  (STANDING):  ethambutol 1200 milliGRAM(s) Oral daily  isoniazid 300 milliGRAM(s) Oral daily  multivitamin 1 Tablet(s) Oral daily  pantoprazole    Tablet 40 milliGRAM(s) Oral two times a day  pyrazinamide 1500 milliGRAM(s) Oral daily  pyridoxine 50 milliGRAM(s) Oral daily  rifampin 600 milliGRAM(s) Oral daily    MEDICATIONS  (PRN):      Allergies    Advil (Swelling)  Motrin (Swelling)  Tylenol (Swelling)    Intolerances          Vital Signs Last 24 Hrs  T(C): 36.8 (17 Oct 2018 05:32), Max: 36.9 (16 Oct 2018 20:47)  T(F): 98.3 (17 Oct 2018 05:32), Max: 98.4 (16 Oct 2018 20:47)  HR: 69 (17 Oct 2018 05:32) (64 - 82)  BP: 127/81 (17 Oct 2018 05:32) (111/65 - 127/81)  BP(mean): --  RR: 17 (17 Oct 2018 05:32) (17 - 17)  SpO2: 100% (17 Oct 2018 05:32) (98% - 100%)  CAPILLARY BLOOD GLUCOSE        I&O's Summary        PHYSICAL EXAM:  GENERAL: NAD, well-developed  HEAD:  AT, NC  EYES: EOMI, PERRLA, conjunctiva and sclera clear  ENMT: Airway patent. MMM. Good dentition, no lesions.  NECK: Supple, No JVD  CHEST/LUNG: CTABL; No wheezing, rhonci, or rales  HEART: RRR; Normal S1, S2. No murmurs, rubs, or gallops  ABDOMEN: Soft, NT, ND; Bowel sounds present. No organomegaly  EXTREMITIES:  2+ Peripheral Pulses, No clubbing, cyanosis, or edema  PSYCH: AAOx3  NEUROLOGY: non-focal  SKIN: Warm, dry, intact; No rashes or lesions      LABS:                        Culture - Acid Fast - Sputum w/Smear (collected 14 Oct 2018 11:18)  Source: SPUTUM  Final Report (14 Oct 2018 15:20):    ***** CRITICAL RESULT *****    PERSON CALLED / READ-BACK:PRINCESS ALVARADO RN/Y    DATE / TIME CALLED: 10/14/18 1520    CALLED BY: PARISA LAZO    AFB^Acid Fast Bacilli    QNTY AFB BY FLUOR. STAIN: (3+) MODERATE    QNTY AFB BY KINYOUN STAIN: (2+) FEW          RADIOLOGY & ADDITIONAL TESTS:    Imaging Personally Reviewed: YES    Consultant(s) Notes Reviewed: YES    Care Discussed with Consultants/Other Providers: YES Tabatha Bowman, PGY1   Contact/Pager - [phone] / [phone]      Patient is a 28y old  Male who presents with a chief complaint of long term cough and concerning CXR findings, admitted for TB rule out (16 Oct 2018 09:53)        SUBJECTIVE / OVERNIGHT EVENTS:  no events overnight       MEDICATIONS  (STANDING):  ethambutol 1200 milliGRAM(s) Oral daily  isoniazid 300 milliGRAM(s) Oral daily  multivitamin 1 Tablet(s) Oral daily  pantoprazole    Tablet 40 milliGRAM(s) Oral two times a day  pyrazinamide 1500 milliGRAM(s) Oral daily  pyridoxine 50 milliGRAM(s) Oral daily  rifampin 600 milliGRAM(s) Oral daily    MEDICATIONS  (PRN):      Allergies    Advil (Swelling)  Motrin (Swelling)  Tylenol (Swelling)    Intolerances          Vital Signs Last 24 Hrs  T(C): 36.8 (17 Oct 2018 05:32), Max: 36.9 (16 Oct 2018 20:47)  T(F): 98.3 (17 Oct 2018 05:32), Max: 98.4 (16 Oct 2018 20:47)  HR: 69 (17 Oct 2018 05:32) (64 - 82)  BP: 127/81 (17 Oct 2018 05:32) (111/65 - 127/81)  BP(mean): --  RR: 17 (17 Oct 2018 05:32) (17 - 17)  SpO2: 100% (17 Oct 2018 05:32) (98% - 100%)  CAPILLARY BLOOD GLUCOSE        I&O's Summary        PHYSICAL EXAM:  GENERAL: NAD, well-developed  HEAD:  AT, NC  EYES: EOMI, PERRLA, conjunctiva and sclera clear  ENMT: Airway patent. MMM. Good dentition, no lesions.  NECK: Supple, No JVD  CHEST/LUNG: CTABL; No wheezing, rhonci, or rales  HEART: RRR; Normal S1, S2. No murmurs, rubs, or gallops  ABDOMEN: Soft, NT, ND; Bowel sounds present. No organomegaly  EXTREMITIES:  2+ Peripheral Pulses, No clubbing, cyanosis, or edema  PSYCH: AAOx3  NEUROLOGY: non-focal  SKIN: Warm, dry, intact; No rashes or lesions      LABS:                        Culture - Acid Fast - Sputum w/Smear (collected 14 Oct 2018 11:18)  Source: SPUTUM  Final Report (14 Oct 2018 15:20):    ***** CRITICAL RESULT *****    PERSON CALLED / READ-BACK:PRINCESS ALVARADO RN/Y    DATE / TIME CALLED: 10/14/18 1520    CALLED BY: PARISA LAZO    AFB^Acid Fast Bacilli    QNTY AFB BY FLUOR. STAIN: (3+) MODERATE    QNTY AFB BY KINYOUN STAIN: (2+) FEW          RADIOLOGY & ADDITIONAL TESTS:    Imaging Personally Reviewed: YES    Consultant(s) Notes Reviewed: YES    Care Discussed with Consultants/Other Providers: YES

## 2018-10-18 PROCEDURE — 99232 SBSQ HOSP IP/OBS MODERATE 35: CPT | Mod: GC

## 2018-10-18 RX ADMIN — PANTOPRAZOLE SODIUM 40 MILLIGRAM(S): 20 TABLET, DELAYED RELEASE ORAL at 18:09

## 2018-10-18 RX ADMIN — ETHAMBUTOL HYDROCHLORIDE 1200 MILLIGRAM(S): 400 TABLET, FILM COATED ORAL at 12:30

## 2018-10-18 RX ADMIN — PANTOPRAZOLE SODIUM 40 MILLIGRAM(S): 20 TABLET, DELAYED RELEASE ORAL at 05:36

## 2018-10-18 RX ADMIN — Medication 50 MILLIGRAM(S): at 12:31

## 2018-10-18 RX ADMIN — Medication 1 TABLET(S): at 12:31

## 2018-10-18 RX ADMIN — Medication 300 MILLIGRAM(S): at 12:31

## 2018-10-18 RX ADMIN — PYRAZINAMIDE 1500 MILLIGRAM(S): 500 TABLET ORAL at 12:30

## 2018-10-18 NOTE — PROGRESS NOTE ADULT - ASSESSMENT
28 year old Egyptian male with PMHx of GERD presenting to ED after X-ray performed by GI for chronic cough demonstrated multifocal consolidations, with admission for rule out of TB. 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

## 2018-10-18 NOTE — PROGRESS NOTE ADULT - SUBJECTIVE AND OBJECTIVE BOX
Tabatha Gracie, PGY1   Contact/Pager - [phone] / [phone]      Patient is a 28y old  Male who presents with a chief complaint of long term cough and concerning CXR findings, admitted for TB rule out (17 Oct 2018 08:04)        SUBJECTIVE / OVERNIGHT EVENTS:      MEDICATIONS  (STANDING):  ethambutol 1200 milliGRAM(s) Oral daily  isoniazid 300 milliGRAM(s) Oral daily  multivitamin 1 Tablet(s) Oral daily  pantoprazole    Tablet 40 milliGRAM(s) Oral two times a day  pyrazinamide 1500 milliGRAM(s) Oral daily  pyridoxine 50 milliGRAM(s) Oral daily  rifampin 600 milliGRAM(s) Oral daily    MEDICATIONS  (PRN):      Allergies    Advil (Swelling)  Motrin (Swelling)  Tylenol (Swelling)    Intolerances          Vital Signs Last 24 Hrs  T(C): 36.9 (18 Oct 2018 05:36), Max: 37 (17 Oct 2018 20:23)  T(F): 98.4 (18 Oct 2018 05:36), Max: 98.6 (17 Oct 2018 20:23)  HR: 85 (18 Oct 2018 05:36) (76 - 86)  BP: 113/73 (18 Oct 2018 05:36) (110/67 - 120/72)  BP(mean): --  RR: 18 (18 Oct 2018 05:36) (17 - 18)  SpO2: 100% (18 Oct 2018 05:36) (99% - 100%)  CAPILLARY BLOOD GLUCOSE        I&O's Summary        PHYSICAL EXAM:  GENERAL: NAD, well-developed  HEAD:  AT, NC  EYES: EOMI, PERRLA, conjunctiva and sclera clear  ENMT: Airway patent. MMM. Good dentition, no lesions.  NECK: Supple, No JVD  CHEST/LUNG: CTABL; No wheezing, rhonci, or rales  HEART: RRR; Normal S1, S2. No murmurs, rubs, or gallops  ABDOMEN: Soft, NT, ND; Bowel sounds present. No organomegaly  EXTREMITIES:  2+ Peripheral Pulses, No clubbing, cyanosis, or edema  PSYCH: AAOx3  NEUROLOGY: non-focal  SKIN: Warm, dry, intact; No rashes or lesions      LABS:                            RADIOLOGY & ADDITIONAL TESTS:    Imaging Personally Reviewed: YES    Consultant(s) Notes Reviewed: YES    Care Discussed with Consultants/Other Providers: YES Tabatha Gracie, PGY1   Contact/Pager - [phone] / [phone]      Patient is a 28y old  Male who presents with a chief complaint of long term cough and concerning CXR findings, admitted for TB rule out (17 Oct 2018 08:04)        SUBJECTIVE / OVERNIGHT EVENTS:  no events overnight      MEDICATIONS  (STANDING):  ethambutol 1200 milliGRAM(s) Oral daily  isoniazid 300 milliGRAM(s) Oral daily  multivitamin 1 Tablet(s) Oral daily  pantoprazole    Tablet 40 milliGRAM(s) Oral two times a day  pyrazinamide 1500 milliGRAM(s) Oral daily  pyridoxine 50 milliGRAM(s) Oral daily  rifampin 600 milliGRAM(s) Oral daily    MEDICATIONS  (PRN):      Allergies    Advil (Swelling)  Motrin (Swelling)  Tylenol (Swelling)    Intolerances          Vital Signs Last 24 Hrs  T(C): 36.9 (18 Oct 2018 05:36), Max: 37 (17 Oct 2018 20:23)  T(F): 98.4 (18 Oct 2018 05:36), Max: 98.6 (17 Oct 2018 20:23)  HR: 85 (18 Oct 2018 05:36) (76 - 86)  BP: 113/73 (18 Oct 2018 05:36) (110/67 - 120/72)  BP(mean): --  RR: 18 (18 Oct 2018 05:36) (17 - 18)  SpO2: 100% (18 Oct 2018 05:36) (99% - 100%)  CAPILLARY BLOOD GLUCOSE        I&O's Summary        PHYSICAL EXAM:  GENERAL: NAD, well-developed  HEAD:  AT, NC  EYES: EOMI, PERRLA, conjunctiva and sclera clear  ENMT: Airway patent. MMM. Good dentition, no lesions.  NECK: Supple, No JVD  CHEST/LUNG: CTABL; No wheezing, rhonci, or rales  HEART: RRR; Normal S1, S2. No murmurs, rubs, or gallops  ABDOMEN: Soft, NT, ND; Bowel sounds present. No organomegaly  EXTREMITIES:  2+ Peripheral Pulses, No clubbing, cyanosis, or edema  PSYCH: AAOx3  NEUROLOGY: non-focal  SKIN: Warm, dry, intact; No rashes or lesions      LABS:                            RADIOLOGY & ADDITIONAL TESTS:    Imaging Personally Reviewed: YES    Consultant(s) Notes Reviewed: YES    Care Discussed with Consultants/Other Providers: YES

## 2018-10-18 NOTE — PROGRESS NOTE ADULT - PROBLEM SELECTOR PLAN 1
Patient with cavitary lesions in CT scan, with positive sputum cultures and positive TB PCR  - ID recs appreciated. - C/w RIPE therapy - will likely need to be hospitalized for weeks to come, will need JORGE clearance before returning home  - C/w daily vitamin b6 50mg daily  -no more sputum cx till 10/22  - family will need to be tested  -ophtho consult appreciated - normal baseline eye exam (ethambutol tox)

## 2018-10-18 NOTE — PROGRESS NOTE ADULT - PROBLEM SELECTOR PLAN 4
DVT: SCD's   Diet: Regular      Sabra Bowman, PGY1  Pager [phone] DVT: SCD's   Diet: Regular  Dispo: long hospital course until JORGE clears patient to return home    Sabra Bowman, PGY1  Pager [phone]

## 2018-10-19 LAB
BUN SERPL-MCNC: 12 MG/DL — SIGNIFICANT CHANGE UP (ref 7–23)
CALCIUM SERPL-MCNC: 9 MG/DL — SIGNIFICANT CHANGE UP (ref 8.4–10.5)
CHLORIDE SERPL-SCNC: 100 MMOL/L — SIGNIFICANT CHANGE UP (ref 98–107)
CO2 SERPL-SCNC: 25 MMOL/L — SIGNIFICANT CHANGE UP (ref 22–31)
CREAT SERPL-MCNC: 0.79 MG/DL — SIGNIFICANT CHANGE UP (ref 0.5–1.3)
GLUCOSE SERPL-MCNC: 86 MG/DL — SIGNIFICANT CHANGE UP (ref 70–99)
HCT VFR BLD CALC: 31.4 % — LOW (ref 39–50)
HGB BLD-MCNC: 10 G/DL — LOW (ref 13–17)
MCHC RBC-ENTMCNC: 24.6 PG — LOW (ref 27–34)
MCHC RBC-ENTMCNC: 31.8 % — LOW (ref 32–36)
MCV RBC AUTO: 77.3 FL — LOW (ref 80–100)
NRBC # FLD: 0 — SIGNIFICANT CHANGE UP
PLATELET # BLD AUTO: 368 K/UL — SIGNIFICANT CHANGE UP (ref 150–400)
PMV BLD: 10.5 FL — SIGNIFICANT CHANGE UP (ref 7–13)
POTASSIUM SERPL-MCNC: 4.4 MMOL/L — SIGNIFICANT CHANGE UP (ref 3.5–5.3)
POTASSIUM SERPL-SCNC: 4.4 MMOL/L — SIGNIFICANT CHANGE UP (ref 3.5–5.3)
RBC # BLD: 4.06 M/UL — LOW (ref 4.2–5.8)
RBC # FLD: 13.2 % — SIGNIFICANT CHANGE UP (ref 10.3–14.5)
SODIUM SERPL-SCNC: 137 MMOL/L — SIGNIFICANT CHANGE UP (ref 135–145)
WBC # BLD: 11.58 K/UL — HIGH (ref 3.8–10.5)
WBC # FLD AUTO: 11.58 K/UL — HIGH (ref 3.8–10.5)

## 2018-10-19 PROCEDURE — 99232 SBSQ HOSP IP/OBS MODERATE 35: CPT

## 2018-10-19 PROCEDURE — 99232 SBSQ HOSP IP/OBS MODERATE 35: CPT | Mod: GC

## 2018-10-19 RX ADMIN — PANTOPRAZOLE SODIUM 40 MILLIGRAM(S): 20 TABLET, DELAYED RELEASE ORAL at 17:48

## 2018-10-19 RX ADMIN — ETHAMBUTOL HYDROCHLORIDE 1200 MILLIGRAM(S): 400 TABLET, FILM COATED ORAL at 14:12

## 2018-10-19 RX ADMIN — Medication 300 MILLIGRAM(S): at 14:11

## 2018-10-19 RX ADMIN — Medication 50 MILLIGRAM(S): at 14:12

## 2018-10-19 RX ADMIN — Medication 1 TABLET(S): at 14:11

## 2018-10-19 RX ADMIN — PYRAZINAMIDE 1500 MILLIGRAM(S): 500 TABLET ORAL at 14:12

## 2018-10-19 RX ADMIN — PANTOPRAZOLE SODIUM 40 MILLIGRAM(S): 20 TABLET, DELAYED RELEASE ORAL at 05:07

## 2018-10-19 NOTE — PROGRESS NOTE ADULT - ASSESSMENT
Pulmonary cavitary TB.  Tolerating RIPE.  Improving clinically.      Suggest: would defer sending additional AFB smear/culture until 10/22                continue RIPE                 check LFTs once or twice weekly.                airborne precautions    I will be away until 10/29.  ID service available for questions.    Obdulia Carrillo MD  Pager: [phone]  After 5 PM or weekends please call fellow on call or office [phone]

## 2018-10-19 NOTE — PROGRESS NOTE ADULT - SUBJECTIVE AND OBJECTIVE BOX
Follow Up:  pulmonary TB    Inverval History/ROS: feels better.  appetite OK.  no fever, chills, cough.  evaluated by optho.    Allergies  Advil (Swelling)  Motrin (Swelling)  Tylenol (Swelling)        ANTIMICROBIALS:  ethambutol 1200 daily  isoniazid 300 daily  pyrazinamide 1500 daily  rifampin 600 daily      OTHER MEDS:  multivitamin 1 Tablet(s) Oral daily  pantoprazole    Tablet 40 milliGRAM(s) Oral two times a day  pyridoxine 50 milliGRAM(s) Oral daily      Vital Signs Last 24 Hrs  T(F): 98.4 (10-19-18 @ 13:33), Max: 98.6 (10-19-18 @ 05:06)  HR: 96 (10-19-18 @ 13:33)  BP: 105/69 (10-19-18 @ 13:33)  RR: 18 (10-19-18 @ 13:33)  SpO2: 98% (10-19-18 @ 13:33) (98% - 100%)    PHYSICAL EXAM:  General: [x ] non-toxic  HEAD/EYES: [ ] PERRL [x ] white sclera [ ] icterus  ENT:  [ ] normal [x ] supple [ ] thrush [ ] pharyngeal exudate  Cardiovascular:   [ ] murmur [x ] normal [ ] PPM/AICD  Respiratory:  [x ] course breath sounds bilaterally  GI:  x[ ] soft, non-tender, normal bowel sounds  :  [ ] schuler [x ] no CVA tenderness   Musculoskeletal:  [x ] no synovitis  Neurologic:  [x ] non-focal exam   Skin:  [x ] no rash  Lymph: [x ] no lymphadenopathy  Psychiatric:  [x ] appropriate affect [x ] alert & oriented  Lines:  [ ] no phlebitis [ ] central line                                10.0   11.58 )-----------( 368      ( 19 Oct 2018 06:15 )             31.4 10-19    137  |  100  |  12  ----------------------------<  86  4.4   |  25  |  0.79  Ca    9.0      19 Oct 2018 06:15          MICROBIOLOGY:  v  SPUTUM  10-14-18 --  --  --AFB 3 +      SPUTUM  10-13-18 --  --  --AFB 3 +      SPUTUM  10-12-18 --  --  --   AFB 4+      SPUTUM  10-12-18 --  --  --      SPUTUM  10-10-18 --  --  --      BLOOD  10-10-18 --  --  --          RADIOLOGY:    < from: CT Chest No Cont (10.11.18 @ 22:37) >    FINDINGS:    CHEST:     LUNGS AND LARGE AIRWAYS: Patent central airways. Thick-walled cavitary   lesions, tree-in-bud opacities, and nodules scattered throughout all lung   lobes, most prominent in the upper lobes. The largest cavitary lesion in   the right upper lobe measures 4.5 x 3.9 cm (2, 21).  PLEURA: No pleural effusion.  VESSELS: Within normal limits.  HEART: Heart size is normal.No pericardial effusion.  MEDIASTINUM AND DEAN: No lymphadenopathy.  CHEST WALL AND LOWER NECK: Within normal limits.  VISUALIZED UPPER ABDOMEN: Within normal limits.  BONES: Within normal limits.    IMPRESSION:   Diffuse, upper lobe predominant thick-walled cavitary lesions,   tree-in-bud opacities, and nodules. Given the clinical circumstances,   primary differential consideration is tuberculosis. Follow-up resolution   is recommended.    < end of copied text >

## 2018-10-19 NOTE — PROGRESS NOTE ADULT - SUBJECTIVE AND OBJECTIVE BOX
Tabatha Gracie, PGY1   Contact/Pager - [phone] / [phone]      Patient is a 28y old  Male who presents with a chief complaint of long term cough and concerning CXR findings, admitted for TB rule out (18 Oct 2018 07:00)        SUBJECTIVE / OVERNIGHT EVENTS:      MEDICATIONS  (STANDING):  ethambutol 1200 milliGRAM(s) Oral daily  isoniazid 300 milliGRAM(s) Oral daily  multivitamin 1 Tablet(s) Oral daily  pantoprazole    Tablet 40 milliGRAM(s) Oral two times a day  pyrazinamide 1500 milliGRAM(s) Oral daily  pyridoxine 50 milliGRAM(s) Oral daily  rifampin 600 milliGRAM(s) Oral daily    MEDICATIONS  (PRN):      Allergies    Advil (Swelling)  Motrin (Swelling)  Tylenol (Swelling)    Intolerances          Vital Signs Last 24 Hrs  T(C): 37 (19 Oct 2018 05:06), Max: 37 (19 Oct 2018 05:06)  T(F): 98.6 (19 Oct 2018 05:06), Max: 98.6 (19 Oct 2018 05:06)  HR: 70 (19 Oct 2018 05:06) (70 - 84)  BP: 115/75 (19 Oct 2018 05:06) (104/66 - 115/75)  BP(mean): --  RR: 18 (19 Oct 2018 05:06) (17 - 18)  SpO2: 100% (19 Oct 2018 05:06) (99% - 100%)  CAPILLARY BLOOD GLUCOSE        I&O's Summary        PHYSICAL EXAM:  GENERAL: NAD, well-developed  HEAD:  AT, NC  EYES: EOMI, PERRLA, conjunctiva and sclera clear  ENMT: Airway patent. MMM. Good dentition, no lesions.  NECK: Supple, No JVD  CHEST/LUNG: CTABL; No wheezing, rhonci, or rales  HEART: RRR; Normal S1, S2. No murmurs, rubs, or gallops  ABDOMEN: Soft, NT, ND; Bowel sounds present. No organomegaly  EXTREMITIES:  2+ Peripheral Pulses, No clubbing, cyanosis, or edema  PSYCH: AAOx3  NEUROLOGY: non-focal  SKIN: Warm, dry, intact; No rashes or lesions      LABS:                        10.0   11.58 )-----------( 368      ( 19 Oct 2018 06:15 )             31.4                             RADIOLOGY & ADDITIONAL TESTS:    Imaging Personally Reviewed: YES    Consultant(s) Notes Reviewed: YES    Care Discussed with Consultants/Other Providers: YES Tabatha Gracie, PGY1   Contact/Pager - [phone] / [phone]      Patient is a 28y old  Male who presents with a chief complaint of long term cough and concerning CXR findings, admitted for TB rule out (18 Oct 2018 07:00)        SUBJECTIVE / OVERNIGHT EVENTS:  no events overnight       MEDICATIONS  (STANDING):  ethambutol 1200 milliGRAM(s) Oral daily  isoniazid 300 milliGRAM(s) Oral daily  multivitamin 1 Tablet(s) Oral daily  pantoprazole    Tablet 40 milliGRAM(s) Oral two times a day  pyrazinamide 1500 milliGRAM(s) Oral daily  pyridoxine 50 milliGRAM(s) Oral daily  rifampin 600 milliGRAM(s) Oral daily    MEDICATIONS  (PRN):      Allergies    Advil (Swelling)  Motrin (Swelling)  Tylenol (Swelling)    Intolerances          Vital Signs Last 24 Hrs  T(C): 37 (19 Oct 2018 05:06), Max: 37 (19 Oct 2018 05:06)  T(F): 98.6 (19 Oct 2018 05:06), Max: 98.6 (19 Oct 2018 05:06)  HR: 70 (19 Oct 2018 05:06) (70 - 84)  BP: 115/75 (19 Oct 2018 05:06) (104/66 - 115/75)  BP(mean): --  RR: 18 (19 Oct 2018 05:06) (17 - 18)  SpO2: 100% (19 Oct 2018 05:06) (99% - 100%)  CAPILLARY BLOOD GLUCOSE        I&O's Summary        PHYSICAL EXAM:  GENERAL: NAD, well-developed  HEAD:  AT, NC  EYES: EOMI, PERRLA, conjunctiva and sclera clear  ENMT: Airway patent. MMM. Good dentition, no lesions.  NECK: Supple, No JVD  CHEST/LUNG: CTABL; No wheezing, rhonci, or rales  HEART: RRR; Normal S1, S2. No murmurs, rubs, or gallops  ABDOMEN: Soft, NT, ND; Bowel sounds present. No organomegaly  EXTREMITIES:  2+ Peripheral Pulses, No clubbing, cyanosis, or edema  PSYCH: AAOx3  NEUROLOGY: non-focal  SKIN: Warm, dry, intact; No rashes or lesions      LABS:                        10.0   11.58 )-----------( 368      ( 19 Oct 2018 06:15 )             31.4                             RADIOLOGY & ADDITIONAL TESTS:    Imaging Personally Reviewed: YES    Consultant(s) Notes Reviewed: YES    Care Discussed with Consultants/Other Providers: YES

## 2018-10-19 NOTE — PROGRESS NOTE ADULT - PROBLEM SELECTOR PLAN 4
DVT: SCD's   Diet: Regular  Dispo: long hospital course until JORGE clears patient to return home    Sabra Bowman, PGY1  Pager [phone]

## 2018-10-19 NOTE — PROGRESS NOTE ADULT - ASSESSMENT
28 year old [place] male with PMHx of GERD presenting to ED after X-ray performed by GI for chronic cough demonstrated multifocal consolidations, with CT chest demonstrating apical cavitary lesion, and positive AFB sputum and PCR for TB, being treated with initiation RIPE therapy for active pulmonary TB.

## 2018-10-20 PROCEDURE — 99232 SBSQ HOSP IP/OBS MODERATE 35: CPT | Mod: GC

## 2018-10-20 RX ADMIN — Medication 300 MILLIGRAM(S): at 14:17

## 2018-10-20 RX ADMIN — PANTOPRAZOLE SODIUM 40 MILLIGRAM(S): 20 TABLET, DELAYED RELEASE ORAL at 17:51

## 2018-10-20 RX ADMIN — Medication 50 MILLIGRAM(S): at 14:17

## 2018-10-20 RX ADMIN — Medication 1 TABLET(S): at 14:17

## 2018-10-20 RX ADMIN — PYRAZINAMIDE 1500 MILLIGRAM(S): 500 TABLET ORAL at 14:16

## 2018-10-20 RX ADMIN — PANTOPRAZOLE SODIUM 40 MILLIGRAM(S): 20 TABLET, DELAYED RELEASE ORAL at 05:27

## 2018-10-20 RX ADMIN — ETHAMBUTOL HYDROCHLORIDE 1200 MILLIGRAM(S): 400 TABLET, FILM COATED ORAL at 14:14

## 2018-10-20 NOTE — PROGRESS NOTE ADULT - PROBLEM SELECTOR PLAN 4
DVT: SCD's   Diet: Regular  Dispo: long hospital course until JORGE clears patient to return home    Sabra Bowman, PGY1  Pager [phone]

## 2018-10-20 NOTE — PROGRESS NOTE ADULT - SUBJECTIVE AND OBJECTIVE BOX
Tabatha Bowman, PGY1   Contact/Pager - [phone] / [phone]      Patient is a 28y old  Male who presents with a chief complaint of long term cough and concerning CXR findings, admitted for TB rule out (19 Oct 2018 15:49)        SUBJECTIVE / OVERNIGHT EVENTS:      MEDICATIONS  (STANDING):  ethambutol 1200 milliGRAM(s) Oral daily  isoniazid 300 milliGRAM(s) Oral daily  multivitamin 1 Tablet(s) Oral daily  pantoprazole    Tablet 40 milliGRAM(s) Oral two times a day  pyrazinamide 1500 milliGRAM(s) Oral daily  pyridoxine 50 milliGRAM(s) Oral daily  rifampin 600 milliGRAM(s) Oral daily    MEDICATIONS  (PRN):      Allergies    Advil (Swelling)  Motrin (Swelling)  Tylenol (Swelling)    Intolerances          Vital Signs Last 24 Hrs  T(C): 37.1 (20 Oct 2018 05:25), Max: 37.1 (19 Oct 2018 20:51)  T(F): 98.7 (20 Oct 2018 05:25), Max: 98.8 (19 Oct 2018 20:51)  HR: 92 (20 Oct 2018 05:25) (64 - 96)  BP: 104/68 (20 Oct 2018 05:25) (104/68 - 107/71)  BP(mean): --  RR: 17 (20 Oct 2018 05:25) (17 - 18)  SpO2: 100% (20 Oct 2018 05:25) (98% - 100%)  CAPILLARY BLOOD GLUCOSE        I&O's Summary        PHYSICAL EXAM:  GENERAL: NAD, well-developed  HEAD:  AT, NC  EYES: EOMI, PERRLA, conjunctiva and sclera clear  ENMT: Airway patent. MMM. Good dentition, no lesions.  NECK: Supple, No JVD  CHEST/LUNG: CTABL; No wheezing, rhonci, or rales  HEART: RRR; Normal S1, S2. No murmurs, rubs, or gallops  ABDOMEN: Soft, NT, ND; Bowel sounds present. No organomegaly  EXTREMITIES:  2+ Peripheral Pulses, No clubbing, cyanosis, or edema  PSYCH: AAOx3  NEUROLOGY: non-focal  SKIN: Warm, dry, intact; No rashes or lesions      LABS:                        10.0   11.58 )-----------( 368      ( 19 Oct 2018 06:15 )             31.4     10-19    137  |  100  |  12  ----------------------------<  86  4.4   |  25  |  0.79    Ca    9.0      19 Oct 2018 06:15                        RADIOLOGY & ADDITIONAL TESTS:    Imaging Personally Reviewed: YES    Consultant(s) Notes Reviewed: YES    Care Discussed with Consultants/Other Providers: YES Tabatha Gracie, PGY1   Contact/Pager - [phone] / [phone]      Patient is a 28y old  Male who presents with a chief complaint of long term cough and concerning CXR findings, admitted for TB rule out (19 Oct 2018 15:49)        SUBJECTIVE / OVERNIGHT EVENTS:  no events overnight   status quo      MEDICATIONS  (STANDING):  ethambutol 1200 milliGRAM(s) Oral daily  isoniazid 300 milliGRAM(s) Oral daily  multivitamin 1 Tablet(s) Oral daily  pantoprazole    Tablet 40 milliGRAM(s) Oral two times a day  pyrazinamide 1500 milliGRAM(s) Oral daily  pyridoxine 50 milliGRAM(s) Oral daily  rifampin 600 milliGRAM(s) Oral daily    MEDICATIONS  (PRN):      Allergies    Advil (Swelling)  Motrin (Swelling)  Tylenol (Swelling)    Intolerances          Vital Signs Last 24 Hrs  T(C): 37.1 (20 Oct 2018 05:25), Max: 37.1 (19 Oct 2018 20:51)  T(F): 98.7 (20 Oct 2018 05:25), Max: 98.8 (19 Oct 2018 20:51)  HR: 92 (20 Oct 2018 05:25) (64 - 96)  BP: 104/68 (20 Oct 2018 05:25) (104/68 - 107/71)  BP(mean): --  RR: 17 (20 Oct 2018 05:25) (17 - 18)  SpO2: 100% (20 Oct 2018 05:25) (98% - 100%)  CAPILLARY BLOOD GLUCOSE        I&O's Summary        PHYSICAL EXAM:  GENERAL: NAD, well-developed  HEAD:  AT, NC  EYES: EOMI, PERRLA, conjunctiva and sclera clear  ENMT: Airway patent. MMM. Good dentition, no lesions.  NECK: Supple, No JVD  CHEST/LUNG: CTABL; No wheezing, rhonci, or rales  HEART: RRR; Normal S1, S2. No murmurs, rubs, or gallops  ABDOMEN: Soft, NT, ND; Bowel sounds present. No organomegaly  EXTREMITIES:  2+ Peripheral Pulses, No clubbing, cyanosis, or edema  PSYCH: AAOx3  NEUROLOGY: non-focal  SKIN: Warm, dry, intact; No rashes or lesions      LABS:                        10.0   11.58 )-----------( 368      ( 19 Oct 2018 06:15 )             31.4     10-19    137  |  100  |  12  ----------------------------<  86  4.4   |  25  |  0.79    Ca    9.0      19 Oct 2018 06:15                        RADIOLOGY & ADDITIONAL TESTS:    Imaging Personally Reviewed: YES    Consultant(s) Notes Reviewed: YES    Care Discussed with Consultants/Other Providers: YES

## 2018-10-20 NOTE — PROGRESS NOTE ADULT - ASSESSMENT
28 year old [place]oc[place] male with PMHx of GERD presenting to ED after X-ray performed by GI for chronic cough demonstrated multifocal consolidations, with CT chest demonstrating apical cavitary lesion, and positive AFB sputum and PCR for TB, being treated with initiation RIPE therapy for active pulmonary TB.

## 2018-10-20 NOTE — PROGRESS NOTE ADULT - PROBLEM SELECTOR PLAN 1
Patient with cavitary lesions in CT scan, with positive sputum cultures and positive TB PCR  - ID recs appreciated. - C/w RIPE therapy - will likely need to be hospitalized for weeks to come, will need JORGE clearance before returning home  - C/w daily vitamin b6 50mg daily  -no more sputum cx till 10/22  - family will need to be tested  -ophtho consult appreciated - normal baseline eye exam (ethambutol tox) Patient with cavitary lesions in CT scan, with positive sputum cultures and positive TB PCR  - ID recs appreciated. - C/w RIPE therapy - will likely need to be hospitalized for weeks to come, will need JORGE clearance before returning home  - C/w daily vitamin b6 50mg daily  -no more sputum cx till 10/22  -will monitor liver enzymes 1-2 times a week  - family will need to be tested  -ophtho consult appreciated - normal baseline eye exam (ethambutol tox)

## 2018-10-21 PROCEDURE — 99232 SBSQ HOSP IP/OBS MODERATE 35: CPT | Mod: GC

## 2018-10-21 RX ORDER — PANTOPRAZOLE SODIUM 20 MG/1
40 TABLET, DELAYED RELEASE ORAL DAILY
Qty: 0 | Refills: 0 | Status: DISCONTINUED | OUTPATIENT
Start: 2018-10-21 | End: 2018-11-08

## 2018-10-21 RX ORDER — SODIUM CHLORIDE 9 MG/ML
3 INJECTION INTRAMUSCULAR; INTRAVENOUS; SUBCUTANEOUS EVERY 8 HOURS
Qty: 0 | Refills: 0 | Status: DISCONTINUED | OUTPATIENT
Start: 2018-10-22 | End: 2018-11-08

## 2018-10-21 RX ADMIN — PANTOPRAZOLE SODIUM 40 MILLIGRAM(S): 20 TABLET, DELAYED RELEASE ORAL at 17:40

## 2018-10-21 RX ADMIN — Medication 1 TABLET(S): at 15:11

## 2018-10-21 RX ADMIN — PANTOPRAZOLE SODIUM 40 MILLIGRAM(S): 20 TABLET, DELAYED RELEASE ORAL at 05:04

## 2018-10-21 RX ADMIN — Medication 300 MILLIGRAM(S): at 15:11

## 2018-10-21 RX ADMIN — Medication 50 MILLIGRAM(S): at 15:11

## 2018-10-21 RX ADMIN — PYRAZINAMIDE 1500 MILLIGRAM(S): 500 TABLET ORAL at 15:11

## 2018-10-21 RX ADMIN — ETHAMBUTOL HYDROCHLORIDE 1200 MILLIGRAM(S): 400 TABLET, FILM COATED ORAL at 15:10

## 2018-10-21 NOTE — PROGRESS NOTE ADULT - ASSESSMENT
28M immigrated from [facility] age 12 found to have active TB initiated RIPE therapy 10/12 with multiple positive sputums; s/p therapy for about 1 week plan to check sputum again tomorrow.

## 2018-10-21 NOTE — PROGRESS NOTE ADULT - PROBLEM SELECTOR PLAN 3
- C/w pantoprazole; changed from BID to daily as patient's symptoms may have been related to TB rather than GERD.

## 2018-10-21 NOTE — PROGRESS NOTE ADULT - SUBJECTIVE AND OBJECTIVE BOX
Karin Fry MD   PGY 3  Pager [phone]  Page [phone] or [phone] after 7 pm ([facility])  Page [phone] or [phone] after 7 pm ([facility])      Patient is a 28y old  Male who presents with a chief complaint of long term cough and concerning CXR findings, admitted for TB rule out (20 Oct 2018 07:18)      INTERVAL HPI/OVERNIGHT EVENTS: no overnight events. No CP, SOB, no abd pain, normal BM and PO intake. Patient aware we will start with sputum collection again tomorrow.       REVIEW OF SYSTEMS:  CONSTITUTIONAL: No fever, chills  ENMT:  No difficulty hearing, no change in vision  NECK: No pain or stiffness  RESPIRATORY: +cough  CARDIOVASCULAR: No chest pain, palpitations  GASTROINTESTINAL: No abdominal pain. No nausea, vomiting, or diarrhea  GENITOURINARY: No dysuria  NEUROLOGICAL: No HA  SKIN: No itching, burning, rashes, or lesions   LYMPH NODES: No enlarged glands  ENDOCRINE: No heat or cold intolerance; No hair loss  MUSCULOSKELETAL: No joint pain or swelling; No muscle, back, or extremity pain  PSYCHIATRIC: No depression, anxiety  HEME/LYMPH: No easy bruising, or bleeding gums    T(C): 36.8 (10-21-18 @ 05:00), Max: 36.8 (10-21-18 @ 05:00)  HR: 84 (10-21-18 @ 05:00) (68 - 84)  BP: 107/68 (10-21-18 @ 05:00) (107/68 - 118/70)  RR: 17 (10-21-18 @ 05:00) (17 - 17)  SpO2: 100% (10-21-18 @ 05:00) (100% - 100%)  Wt(kg): --Vital Signs Last 24 Hrs  T(C): 36.8 (21 Oct 2018 05:00), Max: 36.8 (21 Oct 2018 05:00)  T(F): 98.3 (21 Oct 2018 05:00), Max: 98.3 (21 Oct 2018 05:00)  HR: 84 (21 Oct 2018 05:00) (68 - 84)  BP: 107/68 (21 Oct 2018 05:00) (107/68 - 118/70)  BP(mean): --  RR: 17 (21 Oct 2018 05:00) (17 - 17)  SpO2: 100% (21 Oct 2018 05:00) (100% - 100%)    PHYSICAL EXAM:  GENERAL: NAD  EYES: clear conjunctiva; EOMI  ENMT: Moist mucous membranes  NECK: Supple  CHEST/LUNG: Clear to auscultation bilaterally; No rales, rhonchi, wheezing, or rubs  HEART: S1, S2, Regular rate and rhythm  ABDOMEN: Soft, Nontender, Nondistended; Bowel sounds present  NEURO: Alert & Oriented X3  EXTREMITIES: No LE edema, no calf tenderness  SKIN: No rashes or lesions    Consultant(s) Notes Reviewed:  [x ] YES  [ ] NO  Care Discussed with Consultants/Other Providers [ x] YES  [ ] NO    LABS:              CAPILLARY BLOOD GLUCOSE                RADIOLOGY & ADDITIONAL TESTS:    Imaging Personally Reviewed:  [ ] YES  [ ] NO

## 2018-10-21 NOTE — PROGRESS NOTE ADULT - PROBLEM SELECTOR PLAN 1
-continue with RIPE therapy. Given AFB positive in first 3 sputum samples, patient continued on therapy with plans to check again 10/22 (tomorrow)  -C/w daily vitamin b6 50mg daily  -will monitor liver enzymes 1-2 times a week  -patient's family will be tested by pediatrician/pmd. ophtho consult appreciated - normal baseline eye exam (ethambutol tox)

## 2018-10-22 LAB
ALBUMIN SERPL ELPH-MCNC: 3.2 G/DL — LOW (ref 3.3–5)
ALP SERPL-CCNC: 78 U/L — SIGNIFICANT CHANGE UP (ref 40–120)
ALT FLD-CCNC: 115 U/L — HIGH (ref 4–41)
AST SERPL-CCNC: 63 U/L — HIGH (ref 4–40)
BASOPHILS # BLD AUTO: 0.09 K/UL — SIGNIFICANT CHANGE UP (ref 0–0.2)
BASOPHILS NFR BLD AUTO: 0.7 % — SIGNIFICANT CHANGE UP (ref 0–2)
BILIRUB SERPL-MCNC: 0.3 MG/DL — SIGNIFICANT CHANGE UP (ref 0.2–1.2)
BUN SERPL-MCNC: 12 MG/DL — SIGNIFICANT CHANGE UP (ref 7–23)
CALCIUM SERPL-MCNC: 9.1 MG/DL — SIGNIFICANT CHANGE UP (ref 8.4–10.5)
CHLORIDE SERPL-SCNC: 102 MMOL/L — SIGNIFICANT CHANGE UP (ref 98–107)
CO2 SERPL-SCNC: 25 MMOL/L — SIGNIFICANT CHANGE UP (ref 22–31)
CREAT SERPL-MCNC: 0.79 MG/DL — SIGNIFICANT CHANGE UP (ref 0.5–1.3)
EOSINOPHIL # BLD AUTO: 0.32 K/UL — SIGNIFICANT CHANGE UP (ref 0–0.5)
EOSINOPHIL NFR BLD AUTO: 2.4 % — SIGNIFICANT CHANGE UP (ref 0–6)
GLUCOSE SERPL-MCNC: 95 MG/DL — SIGNIFICANT CHANGE UP (ref 70–99)
HCT VFR BLD CALC: 34.5 % — LOW (ref 39–50)
HGB BLD-MCNC: 10.9 G/DL — LOW (ref 13–17)
IMM GRANULOCYTES # BLD AUTO: 0.06 # — SIGNIFICANT CHANGE UP
IMM GRANULOCYTES NFR BLD AUTO: 0.4 % — SIGNIFICANT CHANGE UP (ref 0–1.5)
LYMPHOCYTES # BLD AUTO: 24.5 % — SIGNIFICANT CHANGE UP (ref 13–44)
LYMPHOCYTES # BLD AUTO: 3.29 K/UL — SIGNIFICANT CHANGE UP (ref 1–3.3)
MAGNESIUM SERPL-MCNC: 1.7 MG/DL — SIGNIFICANT CHANGE UP (ref 1.6–2.6)
MCHC RBC-ENTMCNC: 24.8 PG — LOW (ref 27–34)
MCHC RBC-ENTMCNC: 31.6 % — LOW (ref 32–36)
MCV RBC AUTO: 78.6 FL — LOW (ref 80–100)
MONOCYTES # BLD AUTO: 1 K/UL — HIGH (ref 0–0.9)
MONOCYTES NFR BLD AUTO: 7.5 % — SIGNIFICANT CHANGE UP (ref 2–14)
NEUTROPHILS # BLD AUTO: 8.65 K/UL — HIGH (ref 1.8–7.4)
NEUTROPHILS NFR BLD AUTO: 64.5 % — SIGNIFICANT CHANGE UP (ref 43–77)
NRBC # FLD: 0 — SIGNIFICANT CHANGE UP
PHOSPHATE SERPL-MCNC: 4.3 MG/DL — SIGNIFICANT CHANGE UP (ref 2.5–4.5)
PLATELET # BLD AUTO: 354 K/UL — SIGNIFICANT CHANGE UP (ref 150–400)
PMV BLD: 10.2 FL — SIGNIFICANT CHANGE UP (ref 7–13)
POTASSIUM SERPL-MCNC: 4.2 MMOL/L — SIGNIFICANT CHANGE UP (ref 3.5–5.3)
POTASSIUM SERPL-SCNC: 4.2 MMOL/L — SIGNIFICANT CHANGE UP (ref 3.5–5.3)
PROT SERPL-MCNC: 8 G/DL — SIGNIFICANT CHANGE UP (ref 6–8.3)
RBC # BLD: 4.39 M/UL — SIGNIFICANT CHANGE UP (ref 4.2–5.8)
RBC # FLD: 13.6 % — SIGNIFICANT CHANGE UP (ref 10.3–14.5)
SODIUM SERPL-SCNC: 137 MMOL/L — SIGNIFICANT CHANGE UP (ref 135–145)
WBC # BLD: 13.41 K/UL — HIGH (ref 3.8–10.5)
WBC # FLD AUTO: 13.41 K/UL — HIGH (ref 3.8–10.5)

## 2018-10-22 PROCEDURE — 99232 SBSQ HOSP IP/OBS MODERATE 35: CPT

## 2018-10-22 RX ADMIN — Medication 50 MILLIGRAM(S): at 12:31

## 2018-10-22 RX ADMIN — PYRAZINAMIDE 1500 MILLIGRAM(S): 500 TABLET ORAL at 12:31

## 2018-10-22 RX ADMIN — Medication 300 MILLIGRAM(S): at 12:31

## 2018-10-22 RX ADMIN — PANTOPRAZOLE SODIUM 40 MILLIGRAM(S): 20 TABLET, DELAYED RELEASE ORAL at 12:32

## 2018-10-22 RX ADMIN — Medication 1 TABLET(S): at 12:31

## 2018-10-22 RX ADMIN — ETHAMBUTOL HYDROCHLORIDE 1200 MILLIGRAM(S): 400 TABLET, FILM COATED ORAL at 12:31

## 2018-10-22 NOTE — PROGRESS NOTE ADULT - PROBLEM SELECTOR PLAN 1
-continue with RIPE therapy. Given AFB positive in first 3 sputum samples, patient continued on therapy with plans to check again 10/22 (tomorrow)  -C/w daily vitamin b6 50mg daily  -moderate increase in AST/ALT; will speak with ID about frequency of monitoring and whether RIPE regimen needs to be modified  -patient's family will be tested by pediatrician/pmd. ophtho consult appreciated - normal baseline eye exam (ethambutol tox)

## 2018-10-22 NOTE — PROGRESS NOTE ADULT - PROBLEM SELECTOR PLAN 2
-Microcytic anemia (likely anemia of chronic disease based on iron studies)  -will monitor CBC 1-2x/week

## 2018-10-22 NOTE — PROGRESS NOTE ADULT - ASSESSMENT
28M immigrated from [facility] age 12 found to have active TB initiated RIPE therapy 10/12 with multiple positive sputums.

## 2018-10-22 NOTE — PROGRESS NOTE ADULT - SUBJECTIVE AND OBJECTIVE BOX
Patient is a 28y old  Male who presents with a chief complaint of long term cough and concerning CXR findings, admitted for TB rule out (21 Oct 2018 07:52)        SUBJECTIVE / OVERNIGHT EVENTS: No acute event overnight. Pt states his cough continues to improve. Denies SOB. Had maybe a little nausea over the weekend, but it was short-lived; still tolerating regular diet.       MEDICATIONS  (STANDING):  ethambutol 1200 milliGRAM(s) Oral daily  isoniazid 300 milliGRAM(s) Oral daily  multivitamin 1 Tablet(s) Oral daily  pantoprazole    Tablet 40 milliGRAM(s) Oral daily  pyrazinamide 1500 milliGRAM(s) Oral daily  pyridoxine 50 milliGRAM(s) Oral daily  rifampin 600 milliGRAM(s) Oral daily    MEDICATIONS  (PRN):  sodium chloride 3%  Inhalation 3 milliLiter(s) Inhalation every 8 hours PRN sputum induction      Vital Signs Last 24 Hrs  T(C): 36.8 (22 Oct 2018 13:14), Max: 37.1 (21 Oct 2018 22:33)  T(F): 98.3 (22 Oct 2018 13:14), Max: 98.8 (21 Oct 2018 22:33)  HR: 86 (22 Oct 2018 13:14) (86 - 93)  BP: 122/75 (22 Oct 2018 13:14) (106/68 - 122/75)  BP(mean): --  RR: 18 (22 Oct 2018 13:14) (17 - 18)  SpO2: 100% (22 Oct 2018 13:14) (100% - 100%)  CAPILLARY BLOOD GLUCOSE        I&O's Summary        PHYSICAL EXAM  GENERAL: NAD, well-developed  HEAD:  Atraumatic, Normocephalic  EYES: EOMI, PERRLA, conjunctiva and sclera clear  NECK: Supple, No JVD  CHEST/LUNG: Clear to auscultation bilaterally; No wheeze  HEART: Regular rate and rhythm; No murmurs, rubs, or gallops  ABDOMEN: Soft, Nontender, Nondistended; Bowel sounds present  EXTREMITIES:  2+ Peripheral Pulses, No clubbing, cyanosis, or edema  PSYCH: AAOx3  SKIN: No rashes or lesions    LABS:                        10.9   13.41 )-----------( 354      ( 22 Oct 2018 07:17 )             34.5     10-22    137  |  102  |  12  ----------------------------<  95  4.2   |  25  |  0.79    Ca    9.1      22 Oct 2018 07:17  Phos  4.3     10-22  Mg     1.7     10-22    TPro  8.0  /  Alb  3.2<L>  /  TBili  0.3  /  DBili  x   /  AST  63<H>  /  ALT  115<H>  /  AlkPhos  78  10-22

## 2018-10-23 LAB
ACID FAST STN SPT: SIGNIFICANT CHANGE UP
ALBUMIN SERPL ELPH-MCNC: 3.2 G/DL — LOW (ref 3.3–5)
ALP SERPL-CCNC: 77 U/L — SIGNIFICANT CHANGE UP (ref 40–120)
ALT FLD-CCNC: 103 U/L — HIGH (ref 4–41)
AST SERPL-CCNC: 49 U/L — HIGH (ref 4–40)
BILIRUB DIRECT SERPL-MCNC: 0.1 MG/DL — SIGNIFICANT CHANGE UP (ref 0.1–0.2)
BILIRUB SERPL-MCNC: 0.4 MG/DL — SIGNIFICANT CHANGE UP (ref 0.2–1.2)
BUN SERPL-MCNC: 13 MG/DL — SIGNIFICANT CHANGE UP (ref 7–23)
CALCIUM SERPL-MCNC: 8.8 MG/DL — SIGNIFICANT CHANGE UP (ref 8.4–10.5)
CHLORIDE SERPL-SCNC: 98 MMOL/L — SIGNIFICANT CHANGE UP (ref 98–107)
CO2 SERPL-SCNC: 24 MMOL/L — SIGNIFICANT CHANGE UP (ref 22–31)
CREAT SERPL-MCNC: 0.75 MG/DL — SIGNIFICANT CHANGE UP (ref 0.5–1.3)
GLUCOSE SERPL-MCNC: 90 MG/DL — SIGNIFICANT CHANGE UP (ref 70–99)
HCT VFR BLD CALC: 33.3 % — LOW (ref 39–50)
HGB BLD-MCNC: 10.7 G/DL — LOW (ref 13–17)
MAGNESIUM SERPL-MCNC: 1.7 MG/DL — SIGNIFICANT CHANGE UP (ref 1.6–2.6)
MCHC RBC-ENTMCNC: 25.2 PG — LOW (ref 27–34)
MCHC RBC-ENTMCNC: 32.1 % — SIGNIFICANT CHANGE UP (ref 32–36)
MCV RBC AUTO: 78.5 FL — LOW (ref 80–100)
NRBC # FLD: 0 — SIGNIFICANT CHANGE UP
PHOSPHATE SERPL-MCNC: 3.8 MG/DL — SIGNIFICANT CHANGE UP (ref 2.5–4.5)
PLATELET # BLD AUTO: 340 K/UL — SIGNIFICANT CHANGE UP (ref 150–400)
PMV BLD: 10.3 FL — SIGNIFICANT CHANGE UP (ref 7–13)
POTASSIUM SERPL-MCNC: 4 MMOL/L — SIGNIFICANT CHANGE UP (ref 3.5–5.3)
POTASSIUM SERPL-SCNC: 4 MMOL/L — SIGNIFICANT CHANGE UP (ref 3.5–5.3)
PROT SERPL-MCNC: 8 G/DL — SIGNIFICANT CHANGE UP (ref 6–8.3)
RBC # BLD: 4.24 M/UL — SIGNIFICANT CHANGE UP (ref 4.2–5.8)
RBC # FLD: 13.9 % — SIGNIFICANT CHANGE UP (ref 10.3–14.5)
SODIUM SERPL-SCNC: 135 MMOL/L — SIGNIFICANT CHANGE UP (ref 135–145)
WBC # BLD: 15.86 K/UL — HIGH (ref 3.8–10.5)
WBC # FLD AUTO: 15.86 K/UL — HIGH (ref 3.8–10.5)

## 2018-10-23 PROCEDURE — 99232 SBSQ HOSP IP/OBS MODERATE 35: CPT

## 2018-10-23 RX ADMIN — ETHAMBUTOL HYDROCHLORIDE 1200 MILLIGRAM(S): 400 TABLET, FILM COATED ORAL at 13:13

## 2018-10-23 RX ADMIN — Medication 50 MILLIGRAM(S): at 13:12

## 2018-10-23 RX ADMIN — Medication 300 MILLIGRAM(S): at 13:13

## 2018-10-23 RX ADMIN — PANTOPRAZOLE SODIUM 40 MILLIGRAM(S): 20 TABLET, DELAYED RELEASE ORAL at 13:13

## 2018-10-23 RX ADMIN — Medication 1 TABLET(S): at 13:13

## 2018-10-23 RX ADMIN — PYRAZINAMIDE 1500 MILLIGRAM(S): 500 TABLET ORAL at 13:12

## 2018-10-23 NOTE — PROGRESS NOTE ADULT - ASSESSMENT
28 M immigrated from [facility] age 12 found to have active multifocal cavitary TB initiated on RIPE therapy 10/12 with multiple positive sputums.

## 2018-10-23 NOTE — PROGRESS NOTE ADULT - SUBJECTIVE AND OBJECTIVE BOX
Patient is a 28y old  Male who presents with a chief complaint of long term cough and concerning CXR findings, admitted for TB rule out (23 Oct 2018 10:55)      SUBJECTIVE / OVERNIGHT EVENTS: feels better, cough is improving. had enough sputum today to send out, remains afebrile     ROS:  No HA/DZ  No Vision changes   No CP, SOB  No N/V/D  No Edema  No Rash  NO weakness, numbness    MEDICATIONS  (STANDING):  ethambutol 1200 milliGRAM(s) Oral daily  isoniazid 300 milliGRAM(s) Oral daily  multivitamin 1 Tablet(s) Oral daily  pantoprazole    Tablet 40 milliGRAM(s) Oral daily  pyrazinamide 1500 milliGRAM(s) Oral daily  pyridoxine 50 milliGRAM(s) Oral daily  rifampin 600 milliGRAM(s) Oral daily    MEDICATIONS  (PRN):  sodium chloride 3%  Inhalation 3 milliLiter(s) Inhalation every 8 hours PRN sputum induction      T(C): 37 (10-23-18 @ 04:53)  HR: 90 (10-23-18 @ 04:53)  BP: 115/68 (10-23-18 @ 04:53)  RR: 17 (10-23-18 @ 04:53)  SpO2: 100% (10-23-18 @ 04:53)  CAPILLARY BLOOD GLUCOSE        I&O's Summary      PHYSICAL EXAM:  GENERAL: NAD, well-developed, AOx3  HEAD:  Atraumatic, Normocephalic  EYES: EOMI, PERRL, conjunctiva and sclera clear  NECK: Supple, No JVD  CHEST/LUNG: Clear to auscultation bilaterally  HEART: Regular rate and rhythm; No murmurs, rubs, or gallops, No Edema  ABDOMEN: Soft, Nontender, Nondistended; Bowel sounds present  EXTREMITIES:  2+ Peripheral Pulses, No clubbing, cyanosis  PSYCH: No SI/HI  NEUROLOGY: non-focal  SKIN: No rashes or lesions    LABS:                        10.7   15.86 )-----------( 340      ( 23 Oct 2018 06:01 )             33.3     10-23    135  |  98  |  13  ----------------------------<  90  4.0   |  24  |  0.75    Ca    8.8      23 Oct 2018 06:01  Phos  3.8     10-23  Mg     1.7     10-23    TPro  8.0  /  Alb  3.2<L>  /  TBili  0.4  /  DBili  0.1  /  AST  49<H>  /  ALT  103<H>  /  AlkPhos  77  10-23                  RADIOLOGY & ADDITIONAL TESTS:    Imaging Personally Reviewed:    Consultant(s) Notes Reviewed:      Care Discussed with Consultants/Other Providers: Patient is a 28y old  Male who presents with a chief complaint of long term cough and concerning CXR findings, admitted for TB rule out (23 Oct 2018 10:55)      SUBJECTIVE / OVERNIGHT EVENTS: feels better, cough is improving. had enough sputum today to send out, remains afebrile     ROS:  No HA/DZ  No Vision changes   No CP, SOB  No N/V/D  No Edema  No Rash  NO weakness, numbness    MEDICATIONS  (STANDING):  ethambutol 1200 milliGRAM(s) Oral daily  isoniazid 300 milliGRAM(s) Oral daily  multivitamin 1 Tablet(s) Oral daily  pantoprazole    Tablet 40 milliGRAM(s) Oral daily  pyrazinamide 1500 milliGRAM(s) Oral daily  pyridoxine 50 milliGRAM(s) Oral daily  rifampin 600 milliGRAM(s) Oral daily    MEDICATIONS  (PRN):  sodium chloride 3%  Inhalation 3 milliLiter(s) Inhalation every 8 hours PRN sputum induction      T(C): 37 (10-23-18 @ 04:53)  HR: 90 (10-23-18 @ 04:53)  BP: 115/68 (10-23-18 @ 04:53)  RR: 17 (10-23-18 @ 04:53)  SpO2: 100% (10-23-18 @ 04:53)  CAPILLARY BLOOD GLUCOSE        I&O's Summary      PHYSICAL EXAM:  GENERAL: NAD, well-developed, AOx3  HEAD:  Atraumatic, Normocephalic  EYES: EOMI, PERRL, conjunctiva and sclera clear  NECK: Supple, No JVD  CHEST/LUNG: Clear to auscultation bilaterally  HEART: Regular rate and rhythm; No murmurs, rubs, or gallops, No Edema  ABDOMEN: Soft, Nontender, Nondistended; Bowel sounds present  EXTREMITIES:  2+ Peripheral Pulses, No clubbing, cyanosis  PSYCH: No SI/HI  NEUROLOGY: non-focal  SKIN: No rashes or lesions    LABS:                        10.7   15.86 )-----------( 340      ( 23 Oct 2018 06:01 )             33.3     10-23    135  |  98  |  13  ----------------------------<  90  4.0   |  24  |  0.75    Ca    8.8      23 Oct 2018 06:01  Phos  3.8     10-23  Mg     1.7     10-23    TPro  8.0  /  Alb  3.2<L>  /  TBili  0.4  /  DBili  0.1  /  AST  49<H>  /  ALT  103<H>  /  AlkPhos  77  10-23                  RADIOLOGY & ADDITIONAL TESTS:    Imaging Personally Reviewed:    Consultant(s) Notes Reviewed:      Care Discussed with Consultants/Other Providers: ID- Dr Henao

## 2018-10-23 NOTE — PROGRESS NOTE ADULT - SUBJECTIVE AND OBJECTIVE BOX
Follow Up:  cavitary TB    Interval History/ROS: doing well, decreased, cough, resolved fever and sweats, Denies nausea or GI distress    Allergies  Advil (Swelling)  Motrin (Swelling)  Tylenol (Swelling)    ANTIMICROBIALS:  ethambutol 1200 daily  isoniazid 300 daily  pyrazinamide 1500 daily  rifampin 600 daily      OTHER MEDS:  MEDICATIONS  (STANDING):  pantoprazole    Tablet 40 daily  sodium chloride 3%  Inhalation 3 every 8 hours PRN      Vital Signs Last 24 Hrs  T(C): 37 (23 Oct 2018 04:53), Max: 37.2 (22 Oct 2018 21:11)  T(F): 98.6 (23 Oct 2018 04:53), Max: 99 (22 Oct 2018 21:11)  HR: 90 (23 Oct 2018 04:53) (83 - 90)  BP: 115/68 (23 Oct 2018 04:53) (115/68 - 122/75)  BP(mean): --  RR: 17 (23 Oct 2018 04:53) (17 - 18)  SpO2: 100% (23 Oct 2018 04:53) (100% - 100%)    PHYSICAL EXAM:  General: WN/WD NAD, Non-toxic  Neurology: A&Ox3, nonfocal  Respiratory: Clear to auscultation bilaterally  Abdominal: Soft, Non-tender, non-distended, normal bowel sounds  Extremities: No edema, + peripheral pulses  Line Sites: Clear  Skin: No rash                        10.7   15.86 )-----------( 340      ( 23 Oct 2018 06:01 )             33.3       10-23    135  |  98  |  13  ----------------------------<  90  4.0   |  24  |  0.75    Ca    8.8      23 Oct 2018 06:01  Phos  3.8     10-23  Mg     1.7     10-23    TPro  8.0  /  Alb  3.2<L>  /  TBili  0.4  /  DBili  0.1  /  AST  49<H>  /  ALT  103<H>  /  AlkPhos  77  10-23      MICROBIOLOGY:  SPUTUM  10-14-18 --  --  --      SPUTUM  10-13-18 --  --  --      SPUTUM  10-12-18 --  --  --      SPUTUM  10-12-18 --  --  --      SPUTUM  10-10-18 --  --  --      BLOOD  10-10-18 --  --  --      RADIOLOGY: image viewed  < from: CT Chest No Cont (10.11.18 @ 22:37) >  CHEST:     LUNGS AND LARGE AIRWAYS: Patent central airways. Thick-walled cavitary   lesions, tree-in-bud opacities, and nodules scattered throughout all lung   lobes, most prominent in the upper lobes. The largest cavitary lesion in   the right upper lobe measures 4.5 x 3.9 cm (2, 21).  PLEURA: No pleural effusion.  VESSELS: Within normal limits.  HEART: Heart size is normal.No pericardial effusion.  MEDIASTINUM AND DEAN: No lymphadenopathy.  CHEST WALL AND LOWER NECK: Within normal limits.  VISUALIZED UPPER ABDOMEN: Within normal limits.  BONES: Within normal limits.    IMPRESSION:   Diffuse, upper lobe predominant thick-walled cavitary lesions,   tree-in-bud opacities, and nodules. Given the clinical circumstances,   primary differential consideration is tuberculosis. Follow-up resolution   is recommended.    < end of copied text >      Jonathan Henao MD; Division of Infectious Disease; Pager: [phone]; nights and weekends: [phone]

## 2018-10-23 NOTE — PROGRESS NOTE ADULT - ASSESSMENT
Cavitary multifocal Tuberculosis  On RIPE 10/12-->  Mild transaminitis has not progressed  No clinical hepatitis    Continue RIPE  Trend LFTs    discussed with primary team

## 2018-10-24 LAB
24R-OH-CALCIDIOL SERPL-MCNC: 34.8 NG/ML — SIGNIFICANT CHANGE UP (ref 30–80)
ALBUMIN SERPL ELPH-MCNC: 3.5 G/DL — SIGNIFICANT CHANGE UP (ref 3.3–5)
ALP SERPL-CCNC: 74 U/L — SIGNIFICANT CHANGE UP (ref 40–120)
ALT FLD-CCNC: 95 U/L — HIGH (ref 4–41)
AST SERPL-CCNC: 40 U/L — SIGNIFICANT CHANGE UP (ref 4–40)
BILIRUB SERPL-MCNC: 0.3 MG/DL — SIGNIFICANT CHANGE UP (ref 0.2–1.2)
BUN SERPL-MCNC: 13 MG/DL — SIGNIFICANT CHANGE UP (ref 7–23)
CALCIUM SERPL-MCNC: 9.1 MG/DL — SIGNIFICANT CHANGE UP (ref 8.4–10.5)
CHLORIDE SERPL-SCNC: 100 MMOL/L — SIGNIFICANT CHANGE UP (ref 98–107)
CO2 SERPL-SCNC: 25 MMOL/L — SIGNIFICANT CHANGE UP (ref 22–31)
CREAT SERPL-MCNC: 0.77 MG/DL — SIGNIFICANT CHANGE UP (ref 0.5–1.3)
CULTURE - ACID FAST SMEAR CONCENTRATED: SIGNIFICANT CHANGE UP
GLUCOSE SERPL-MCNC: 91 MG/DL — SIGNIFICANT CHANGE UP (ref 70–99)
HCT VFR BLD CALC: 33.9 % — LOW (ref 39–50)
HGB BLD-MCNC: 10.6 G/DL — LOW (ref 13–17)
MCHC RBC-ENTMCNC: 24.7 PG — LOW (ref 27–34)
MCHC RBC-ENTMCNC: 31.3 % — LOW (ref 32–36)
MCV RBC AUTO: 79 FL — LOW (ref 80–100)
NRBC # FLD: 0 — SIGNIFICANT CHANGE UP
PLATELET # BLD AUTO: 342 K/UL — SIGNIFICANT CHANGE UP (ref 150–400)
PMV BLD: 10.2 FL — SIGNIFICANT CHANGE UP (ref 7–13)
POTASSIUM SERPL-MCNC: 4.4 MMOL/L — SIGNIFICANT CHANGE UP (ref 3.5–5.3)
POTASSIUM SERPL-SCNC: 4.4 MMOL/L — SIGNIFICANT CHANGE UP (ref 3.5–5.3)
PROT SERPL-MCNC: 8.3 G/DL — SIGNIFICANT CHANGE UP (ref 6–8.3)
RBC # BLD: 4.29 M/UL — SIGNIFICANT CHANGE UP (ref 4.2–5.8)
RBC # FLD: 14.1 % — SIGNIFICANT CHANGE UP (ref 10.3–14.5)
SODIUM SERPL-SCNC: 138 MMOL/L — SIGNIFICANT CHANGE UP (ref 135–145)
SPECIMEN SOURCE: SIGNIFICANT CHANGE UP
WBC # BLD: 14.11 K/UL — HIGH (ref 3.8–10.5)
WBC # FLD AUTO: 14.11 K/UL — HIGH (ref 3.8–10.5)

## 2018-10-24 PROCEDURE — 99232 SBSQ HOSP IP/OBS MODERATE 35: CPT

## 2018-10-24 RX ADMIN — Medication 50 MILLIGRAM(S): at 14:04

## 2018-10-24 RX ADMIN — Medication 300 MILLIGRAM(S): at 14:03

## 2018-10-24 RX ADMIN — Medication 1 TABLET(S): at 14:04

## 2018-10-24 RX ADMIN — PANTOPRAZOLE SODIUM 40 MILLIGRAM(S): 20 TABLET, DELAYED RELEASE ORAL at 14:03

## 2018-10-24 RX ADMIN — PYRAZINAMIDE 1500 MILLIGRAM(S): 500 TABLET ORAL at 14:03

## 2018-10-24 RX ADMIN — ETHAMBUTOL HYDROCHLORIDE 1200 MILLIGRAM(S): 400 TABLET, FILM COATED ORAL at 14:02

## 2018-10-24 NOTE — PROGRESS NOTE ADULT - PROBLEM SELECTOR PLAN 1
-continue with RIPE therapy. Given AFB positive in first 3 sputum samples, patient continued on therapy with repeat sputum pending   -C/w daily vitamin B6 50mg daily  - c/w isolation   -mild transaminitis has not progressed, no s/s of hepatitis   -patient's family will be tested by pediatrician/pmd. ophtho consult appreciated - normal baseline eye exam (ethambutol tox)  - per infx control, need 3 neg sputums - JORGE following

## 2018-10-24 NOTE — PROGRESS NOTE ADULT - ASSESSMENT
28 M immigrated from [facility] age 12 found to have active multifocal cavitary TB initiated on RIPE therapy 10/12 with multiple positive sputums.

## 2018-10-24 NOTE — PROGRESS NOTE ADULT - SUBJECTIVE AND OBJECTIVE BOX
Patient is a 28y old  Male who presents with a chief complaint of long term cough and concerning CXR findings, admitted for TB rule out (23 Oct 2018 12:35)      SUBJECTIVE / OVERNIGHT EVENTS: no events feels overall well, residual cough and some night sweats     ROS:  No HA/DZ  No Vision changes   No CP, SOB  No N/V/D  No Edema  No Rash  NO weakness, numbness    MEDICATIONS  (STANDING):  ethambutol 1200 milliGRAM(s) Oral daily  isoniazid 300 milliGRAM(s) Oral daily  multivitamin 1 Tablet(s) Oral daily  pantoprazole    Tablet 40 milliGRAM(s) Oral daily  pyrazinamide 1500 milliGRAM(s) Oral daily  pyridoxine 50 milliGRAM(s) Oral daily  rifampin 600 milliGRAM(s) Oral daily    MEDICATIONS  (PRN):  sodium chloride 3%  Inhalation 3 milliLiter(s) Inhalation every 8 hours PRN sputum induction      T(C): 36.9 (10-24-18 @ 05:36)  HR: 112 (10-24-18 @ 05:36)  BP: 109/74 (10-24-18 @ 05:36)  RR: 18 (10-24-18 @ 05:36)  SpO2: 98% (10-24-18 @ 05:36)  CAPILLARY BLOOD GLUCOSE        I&O's Summary      PHYSICAL EXAM:  GENERAL: NAD, well-developed, AOx3  HEAD:  Atraumatic, Normocephalic  EYES: EOMI, PERRL, conjunctiva and sclera clear  NECK: Supple, No JVD  CHEST/LUNG: Clear to auscultation bilaterally  HEART: Regular rate and rhythm; No murmurs, rubs, or gallops, No Edema  ABDOMEN: Soft, Nontender, Nondistended; Bowel sounds present  EXTREMITIES:  2+ Peripheral Pulses, No clubbing, cyanosis  PSYCH: No SI/HI  NEUROLOGY: non-focal  SKIN: No rashes or lesions    LABS:                        10.6   14.11 )-----------( 342      ( 24 Oct 2018 05:42 )             33.9     10-24    138  |  100  |  13  ----------------------------<  91  4.4   |  25  |  0.77    Ca    9.1      24 Oct 2018 05:42  Phos  3.8     10-23  Mg     1.7     10-23    TPro  8.3  /  Alb  3.5  /  TBili  0.3  /  DBili  x   /  AST  40  /  ALT  95<H>  /  AlkPhos  74  10-24                  RADIOLOGY & ADDITIONAL TESTS:    Imaging Personally Reviewed:    Consultant(s) Notes Reviewed:      Care Discussed with Consultants/Other Providers:

## 2018-10-24 NOTE — PROGRESS NOTE ADULT - ASSESSMENT
Cavitary multifocal Tuberculosis  On RIPE 10/12-->  Mild transaminitis now improved  No clinical hepatitis  Tolerating TB therapy  appears to be clearing sputums  leukocytosis    Continue RIPE  repeat sputum AFB

## 2018-10-25 LAB
ALBUMIN SERPL ELPH-MCNC: 3.7 G/DL — SIGNIFICANT CHANGE UP (ref 3.3–5)
ALP SERPL-CCNC: 77 U/L — SIGNIFICANT CHANGE UP (ref 40–120)
ALT FLD-CCNC: 90 U/L — HIGH (ref 4–41)
AST SERPL-CCNC: 36 U/L — SIGNIFICANT CHANGE UP (ref 4–40)
BASOPHILS # BLD AUTO: 0.14 K/UL — SIGNIFICANT CHANGE UP (ref 0–0.2)
BASOPHILS NFR BLD AUTO: 0.8 % — SIGNIFICANT CHANGE UP (ref 0–2)
BILIRUB SERPL-MCNC: 0.5 MG/DL — SIGNIFICANT CHANGE UP (ref 0.2–1.2)
BUN SERPL-MCNC: 15 MG/DL — SIGNIFICANT CHANGE UP (ref 7–23)
CALCIUM SERPL-MCNC: 9.6 MG/DL — SIGNIFICANT CHANGE UP (ref 8.4–10.5)
CHLORIDE SERPL-SCNC: 100 MMOL/L — SIGNIFICANT CHANGE UP (ref 98–107)
CO2 SERPL-SCNC: 24 MMOL/L — SIGNIFICANT CHANGE UP (ref 22–31)
CREAT SERPL-MCNC: 0.84 MG/DL — SIGNIFICANT CHANGE UP (ref 0.5–1.3)
CULTURE - ACID FAST SMEAR CONCENTRATED: SIGNIFICANT CHANGE UP
EOSINOPHIL # BLD AUTO: 0.29 K/UL — SIGNIFICANT CHANGE UP (ref 0–0.5)
EOSINOPHIL NFR BLD AUTO: 1.7 % — SIGNIFICANT CHANGE UP (ref 0–6)
GLUCOSE SERPL-MCNC: 107 MG/DL — HIGH (ref 70–99)
HCT VFR BLD CALC: 37.7 % — LOW (ref 39–50)
HCT VFR BLD CALC: 37.7 % — LOW (ref 39–50)
HGB BLD-MCNC: 11.8 G/DL — LOW (ref 13–17)
HGB BLD-MCNC: 11.8 G/DL — LOW (ref 13–17)
IMM GRANULOCYTES # BLD AUTO: 0.07 # — SIGNIFICANT CHANGE UP
IMM GRANULOCYTES NFR BLD AUTO: 0.4 % — SIGNIFICANT CHANGE UP (ref 0–1.5)
LYMPHOCYTES # BLD AUTO: 22.7 % — SIGNIFICANT CHANGE UP (ref 13–44)
LYMPHOCYTES # BLD AUTO: 3.95 K/UL — HIGH (ref 1–3.3)
MCHC RBC-ENTMCNC: 25.1 PG — LOW (ref 27–34)
MCHC RBC-ENTMCNC: 25.1 PG — LOW (ref 27–34)
MCHC RBC-ENTMCNC: 31.3 % — LOW (ref 32–36)
MCHC RBC-ENTMCNC: 31.3 % — LOW (ref 32–36)
MCV RBC AUTO: 80 FL — SIGNIFICANT CHANGE UP (ref 80–100)
MCV RBC AUTO: 80 FL — SIGNIFICANT CHANGE UP (ref 80–100)
MONOCYTES # BLD AUTO: 1.31 K/UL — HIGH (ref 0–0.9)
MONOCYTES NFR BLD AUTO: 7.5 % — SIGNIFICANT CHANGE UP (ref 2–14)
NEUTROPHILS # BLD AUTO: 11.61 K/UL — HIGH (ref 1.8–7.4)
NEUTROPHILS NFR BLD AUTO: 66.9 % — SIGNIFICANT CHANGE UP (ref 43–77)
NRBC # FLD: 0 — SIGNIFICANT CHANGE UP
NRBC # FLD: 0 — SIGNIFICANT CHANGE UP
PLATELET # BLD AUTO: 391 K/UL — SIGNIFICANT CHANGE UP (ref 150–400)
PLATELET # BLD AUTO: 391 K/UL — SIGNIFICANT CHANGE UP (ref 150–400)
PMV BLD: 10.3 FL — SIGNIFICANT CHANGE UP (ref 7–13)
PMV BLD: 10.3 FL — SIGNIFICANT CHANGE UP (ref 7–13)
POTASSIUM SERPL-MCNC: 4.8 MMOL/L — SIGNIFICANT CHANGE UP (ref 3.5–5.3)
POTASSIUM SERPL-SCNC: 4.8 MMOL/L — SIGNIFICANT CHANGE UP (ref 3.5–5.3)
PROT SERPL-MCNC: 9.4 G/DL — HIGH (ref 6–8.3)
RBC # BLD: 4.71 M/UL — SIGNIFICANT CHANGE UP (ref 4.2–5.8)
RBC # BLD: 4.71 M/UL — SIGNIFICANT CHANGE UP (ref 4.2–5.8)
RBC # FLD: 14.1 % — SIGNIFICANT CHANGE UP (ref 10.3–14.5)
RBC # FLD: 14.1 % — SIGNIFICANT CHANGE UP (ref 10.3–14.5)
SODIUM SERPL-SCNC: 139 MMOL/L — SIGNIFICANT CHANGE UP (ref 135–145)
SPECIMEN SOURCE: SIGNIFICANT CHANGE UP
WBC # BLD: 17.37 K/UL — HIGH (ref 3.8–10.5)
WBC # BLD: 17.37 K/UL — HIGH (ref 3.8–10.5)
WBC # FLD AUTO: 17.37 K/UL — HIGH (ref 3.8–10.5)
WBC # FLD AUTO: 17.37 K/UL — HIGH (ref 3.8–10.5)

## 2018-10-25 PROCEDURE — 99232 SBSQ HOSP IP/OBS MODERATE 35: CPT

## 2018-10-25 RX ADMIN — Medication 1 TABLET(S): at 13:41

## 2018-10-25 RX ADMIN — Medication 300 MILLIGRAM(S): at 13:41

## 2018-10-25 RX ADMIN — ETHAMBUTOL HYDROCHLORIDE 1200 MILLIGRAM(S): 400 TABLET, FILM COATED ORAL at 13:42

## 2018-10-25 RX ADMIN — Medication 50 MILLIGRAM(S): at 13:42

## 2018-10-25 RX ADMIN — PANTOPRAZOLE SODIUM 40 MILLIGRAM(S): 20 TABLET, DELAYED RELEASE ORAL at 13:42

## 2018-10-25 RX ADMIN — PYRAZINAMIDE 1500 MILLIGRAM(S): 500 TABLET ORAL at 13:41

## 2018-10-25 NOTE — PROGRESS NOTE ADULT - SUBJECTIVE AND OBJECTIVE BOX
Patient is a 28y old  Male who presents with a chief complaint of long term cough and concerning CXR findings, admitted for TB rule out (24 Oct 2018 18:01)      SUBJECTIVE / OVERNIGHT EVENTS: feels well, but overall weak. residual cough, afebrile     ROS:  No HA/DZ  No Vision changes   No CP, SOB  No N/V/D  No Edema  No Rash  NO weakness, numbness    MEDICATIONS  (STANDING):  ethambutol 1200 milliGRAM(s) Oral daily  isoniazid 300 milliGRAM(s) Oral daily  multivitamin 1 Tablet(s) Oral daily  pantoprazole    Tablet 40 milliGRAM(s) Oral daily  pyrazinamide 1500 milliGRAM(s) Oral daily  pyridoxine 50 milliGRAM(s) Oral daily  rifampin 600 milliGRAM(s) Oral daily    MEDICATIONS  (PRN):  sodium chloride 3%  Inhalation 3 milliLiter(s) Inhalation every 8 hours PRN sputum induction      T(C): 37.3 (10-25-18 @ 06:42)  HR: 97 (10-25-18 @ 06:42)  BP: 111/69 (10-25-18 @ 06:42)  RR: 17 (10-25-18 @ 06:42)  SpO2: 100% (10-25-18 @ 06:42)  CAPILLARY BLOOD GLUCOSE        I&O's Summary      PHYSICAL EXAM:  GENERAL: NAD, well-developed, AOx3  HEAD:  Atraumatic, Normocephalic  EYES: EOMI, PERRL, conjunctiva and sclera clear  NECK: Supple, No JVD  CHEST/LUNG: Clear to auscultation bilaterally  HEART: Regular rate and rhythm; No murmurs, rubs, or gallops, No Edema  ABDOMEN: Soft, Nontender, Nondistended; Bowel sounds present  EXTREMITIES:  2+ Peripheral Pulses, No clubbing, cyanosis  PSYCH: No SI/HI  NEUROLOGY: non-focal  SKIN: No rashes or lesions    LABS:                        11.8   17.37 )-----------( 391      ( 25 Oct 2018 06:41 )             37.7     10-25    139  |  100  |  15  ----------------------------<  107<H>  4.8   |  24  |  0.84    Ca    9.6      25 Oct 2018 06:41    TPro  9.4<H>  /  Alb  3.7  /  TBili  0.5  /  DBili  x   /  AST  36  /  ALT  90<H>  /  AlkPhos  77  10-25                  RADIOLOGY & ADDITIONAL TESTS:    Imaging Personally Reviewed:    Consultant(s) Notes Reviewed:      Care Discussed with Consultants/Other Providers:

## 2018-10-25 NOTE — PROGRESS NOTE ADULT - PROBLEM SELECTOR PLAN 1
-continue with RIPE therapy.  -C/w daily vitamin B6 50mg daily  - c/w isolation   - now with 1st AFB sputum neg, repeat pending, will check 3 samples  -mild transaminitis has not progressed, no s/s of hepatitis   -patient's family will be tested by pediatrician/pmd. ophtho consult appreciated - normal baseline eye exam (ethambutol tox)  - per infx control, need 3 neg sputums - JORGE following

## 2018-10-25 NOTE — PROGRESS NOTE ADULT - ASSESSMENT
28 M immigrated from [facility] age 12 found to have active multifocal cavitary TB initiated on RIPE therapy 10/12 with multiple positive sputums.

## 2018-10-26 LAB
HCT VFR BLD CALC: 37.9 % — LOW (ref 39–50)
HGB BLD-MCNC: 12.1 G/DL — LOW (ref 13–17)
MCHC RBC-ENTMCNC: 25.3 PG — LOW (ref 27–34)
MCHC RBC-ENTMCNC: 31.9 % — LOW (ref 32–36)
MCV RBC AUTO: 79.1 FL — LOW (ref 80–100)
NRBC # FLD: 0 — SIGNIFICANT CHANGE UP
PLATELET # BLD AUTO: 370 K/UL — SIGNIFICANT CHANGE UP (ref 150–400)
PMV BLD: 10.4 FL — SIGNIFICANT CHANGE UP (ref 7–13)
RBC # BLD: 4.79 M/UL — SIGNIFICANT CHANGE UP (ref 4.2–5.8)
RBC # FLD: 14.3 % — SIGNIFICANT CHANGE UP (ref 10.3–14.5)
WBC # BLD: 14.43 K/UL — HIGH (ref 3.8–10.5)
WBC # FLD AUTO: 14.43 K/UL — HIGH (ref 3.8–10.5)

## 2018-10-26 PROCEDURE — 99232 SBSQ HOSP IP/OBS MODERATE 35: CPT

## 2018-10-26 PROCEDURE — 99231 SBSQ HOSP IP/OBS SF/LOW 25: CPT

## 2018-10-26 RX ADMIN — PANTOPRAZOLE SODIUM 40 MILLIGRAM(S): 20 TABLET, DELAYED RELEASE ORAL at 12:56

## 2018-10-26 RX ADMIN — Medication 300 MILLIGRAM(S): at 12:56

## 2018-10-26 RX ADMIN — Medication 50 MILLIGRAM(S): at 12:56

## 2018-10-26 RX ADMIN — Medication 1 TABLET(S): at 12:56

## 2018-10-26 RX ADMIN — PYRAZINAMIDE 1500 MILLIGRAM(S): 500 TABLET ORAL at 12:55

## 2018-10-26 RX ADMIN — ETHAMBUTOL HYDROCHLORIDE 1200 MILLIGRAM(S): 400 TABLET, FILM COATED ORAL at 12:56

## 2018-10-26 NOTE — PROGRESS NOTE ADULT - SUBJECTIVE AND OBJECTIVE BOX
Patient is a 28y old  Male who presents with a chief complaint of long term cough and concerning CXR findings, admitted for TB rule out (25 Oct 2018 12:01)      SUBJECTIVE / OVERNIGHT EVENTS: no events or new complaints     ROS:  No HA/DZ  No Vision changes   No CP, SOB  No N/V/D  No Edema  No Rash  NO weakness, numbness    MEDICATIONS  (STANDING):  ethambutol 1200 milliGRAM(s) Oral daily  isoniazid 300 milliGRAM(s) Oral daily  multivitamin 1 Tablet(s) Oral daily  pantoprazole    Tablet 40 milliGRAM(s) Oral daily  pyrazinamide 1500 milliGRAM(s) Oral daily  pyridoxine 50 milliGRAM(s) Oral daily  rifampin 600 milliGRAM(s) Oral daily    MEDICATIONS  (PRN):  sodium chloride 3%  Inhalation 3 milliLiter(s) Inhalation every 8 hours PRN sputum induction      T(C): 36.8 (10-26-18 @ 06:27)  HR: 99 (10-26-18 @ 06:27)  BP: 124/73 (10-26-18 @ 06:27)  RR: 17 (10-26-18 @ 06:27)  SpO2: 98% (10-26-18 @ 06:27)  CAPILLARY BLOOD GLUCOSE        I&O's Summary      PHYSICAL EXAM:  GENERAL: NAD, well-developed, AOx3  HEAD:  Atraumatic, Normocephalic  EYES: EOMI, PERRL, conjunctiva and sclera clear  NECK: Supple, No JVD  CHEST/LUNG: Clear to auscultation bilaterally  HEART: Regular rate and rhythm; No murmurs, rubs, or gallops, No Edema  ABDOMEN: Soft, Nontender, Nondistended; Bowel sounds present  EXTREMITIES:  2+ Peripheral Pulses, No clubbing, cyanosis  PSYCH: No SI/HI  NEUROLOGY: non-focal  SKIN: No rashes or lesions    LABS:                        12.1   14.43 )-----------( 370      ( 26 Oct 2018 06:27 )             37.9     10-25    139  |  100  |  15  ----------------------------<  107<H>  4.8   |  24  |  0.84    Ca    9.6      25 Oct 2018 06:41    TPro  9.4<H>  /  Alb  3.7  /  TBili  0.5  /  DBili  x   /  AST  36  /  ALT  90<H>  /  AlkPhos  77  10-25                  RADIOLOGY & ADDITIONAL TESTS:    Imaging Personally Reviewed:    Consultant(s) Notes Reviewed:      Care Discussed with Consultants/Other Providers: MARCIN Henao

## 2018-10-26 NOTE — PROGRESS NOTE ADULT - ASSESSMENT
28 M immigrated from [facility] age 12 found to have active multifocal cavitary TB initiated on RIPE therapy 10/12 with multiple positive sputums.

## 2018-10-26 NOTE — PROGRESS NOTE ADULT - PROBLEM SELECTOR PLAN 1
-continue with RIPE therapy.  -C/w daily vitamin B6 50mg daily  - c/w isolation   - repeat sputum again +, will repeat on Monday, need 3 neg samples   - mild transaminitis has not progressed, no s/s of hepatitis   - patient's family will be tested by pediatrician/pmd. ophtho consult appreciated - normal baseline eye exam (ethambutol tox)  - per infx control, need 3 neg sputums - JORGE following

## 2018-10-26 NOTE — PROGRESS NOTE ADULT - SUBJECTIVE AND OBJECTIVE BOX
Follow Up:  cavitary TB    Interval History/ROS: sleeping    Allergies  Advil (Swelling)  Motrin (Swelling)  Tylenol (Swelling)        ANTIMICROBIALS:  ethambutol 1200 daily  isoniazid 300 daily  pyrazinamide 1500 daily  rifampin 600 daily      OTHER MEDS:  MEDICATIONS  (STANDING):  pantoprazole    Tablet 40 daily  sodium chloride 3%  Inhalation 3 every 8 hours PRN      Vital Signs Last 24 Hrs  T(C): 36.7 (26 Oct 2018 13:36), Max: 37.2 (25 Oct 2018 20:50)  T(F): 98 (26 Oct 2018 13:36), Max: 98.9 (25 Oct 2018 20:50)  HR: 79 (26 Oct 2018 13:36) (79 - 99)  BP: 122/74 (26 Oct 2018 13:36) (120/82 - 124/73)  BP(mean): --  RR: 17 (26 Oct 2018 13:36) (17 - 17)  SpO2: 100% (26 Oct 2018 13:36) (98% - 100%)    PHYSICAL EXAM:  General: WN/WD NAD, Non-toxic  Neurology: asleep  Respiratory: no resp distress  Skin: No rash                        12.1   14.43 )-----------( 370      ( 26 Oct 2018 06:27 )             37.9   WBC Count: 14.43 (10-26 @ 06:27)  WBC Count: 17.37 (10-25 @ 06:41)  WBC Count: 17.37 (10-25 @ 06:41)  WBC Count: 14.11 (10-24 @ 05:42)  WBC Count: 15.86 (10-23 @ 06:01)  WBC Count: 13.41 (10-22 @ 07:17)    10-25    139  |  100  |  15  ----------------------------<  107<H>  4.8   |  24  |  0.84    Ca    9.6      25 Oct 2018 06:41    TPro  9.4<H>  /  Alb  3.7  /  TBili  0.5  /  DBili  x   /  AST  36  /  ALT  90<H>  /  AlkPhos  77  10-25          MICROBIOLOGY:  SPUTUM  10-25-18 --  --  --  +AFB FEW      SPUTUM  10-23-18 --  --  --      SPUTUM  10-14-18 --  --  --      SPUTUM  10-13-18 --  --  --      SPUTUM  10-12-18 --  --  --      SPUTUM  10-12-18 --  --  --      SPUTUM  10-10-18 --  --  --      BLOOD  10-10-18 --  --  --      RADIOLOGY:    Jonathan Henao MD; Division of Infectious Disease; Pager: [phone]; nights and weekends: [phone]

## 2018-10-26 NOTE — PROGRESS NOTE ADULT - ASSESSMENT
Cavitary multifocal Tuberculosis  -isolate has tested negative for Rifampin resistance gene  On RIPE 10/12-->  Mild transaminitis continues to  improve  No clinical hepatitis  Tolerating TB therapy  leukocytosis    Continue RIPE  repeat sputum AFB early next week

## 2018-10-27 LAB
ALBUMIN SERPL ELPH-MCNC: 3.7 G/DL — SIGNIFICANT CHANGE UP (ref 3.3–5)
ALP SERPL-CCNC: 78 U/L — SIGNIFICANT CHANGE UP (ref 40–120)
ALT FLD-CCNC: 64 U/L — HIGH (ref 4–41)
AST SERPL-CCNC: 33 U/L — SIGNIFICANT CHANGE UP (ref 4–40)
BASOPHILS # BLD AUTO: 0.12 K/UL — SIGNIFICANT CHANGE UP (ref 0–0.2)
BASOPHILS NFR BLD AUTO: 0.8 % — SIGNIFICANT CHANGE UP (ref 0–2)
BILIRUB SERPL-MCNC: 0.3 MG/DL — SIGNIFICANT CHANGE UP (ref 0.2–1.2)
BUN SERPL-MCNC: 14 MG/DL — SIGNIFICANT CHANGE UP (ref 7–23)
CALCIUM SERPL-MCNC: 9.3 MG/DL — SIGNIFICANT CHANGE UP (ref 8.4–10.5)
CHLORIDE SERPL-SCNC: 100 MMOL/L — SIGNIFICANT CHANGE UP (ref 98–107)
CO2 SERPL-SCNC: 24 MMOL/L — SIGNIFICANT CHANGE UP (ref 22–31)
CREAT SERPL-MCNC: 0.74 MG/DL — SIGNIFICANT CHANGE UP (ref 0.5–1.3)
EOSINOPHIL # BLD AUTO: 0.37 K/UL — SIGNIFICANT CHANGE UP (ref 0–0.5)
EOSINOPHIL NFR BLD AUTO: 2.4 % — SIGNIFICANT CHANGE UP (ref 0–6)
GLUCOSE SERPL-MCNC: 89 MG/DL — SIGNIFICANT CHANGE UP (ref 70–99)
HCT VFR BLD CALC: 33.9 % — LOW (ref 39–50)
HGB BLD-MCNC: 11 G/DL — LOW (ref 13–17)
IMM GRANULOCYTES # BLD AUTO: 0.07 # — SIGNIFICANT CHANGE UP
IMM GRANULOCYTES NFR BLD AUTO: 0.4 % — SIGNIFICANT CHANGE UP (ref 0–1.5)
LYMPHOCYTES # BLD AUTO: 23.4 % — SIGNIFICANT CHANGE UP (ref 13–44)
LYMPHOCYTES # BLD AUTO: 3.65 K/UL — HIGH (ref 1–3.3)
MCHC RBC-ENTMCNC: 24.9 PG — LOW (ref 27–34)
MCHC RBC-ENTMCNC: 32.4 % — SIGNIFICANT CHANGE UP (ref 32–36)
MCV RBC AUTO: 76.9 FL — LOW (ref 80–100)
MONOCYTES # BLD AUTO: 1.04 K/UL — HIGH (ref 0–0.9)
MONOCYTES NFR BLD AUTO: 6.7 % — SIGNIFICANT CHANGE UP (ref 2–14)
NEUTROPHILS # BLD AUTO: 10.34 K/UL — HIGH (ref 1.8–7.4)
NEUTROPHILS NFR BLD AUTO: 66.3 % — SIGNIFICANT CHANGE UP (ref 43–77)
NRBC # FLD: 0 — SIGNIFICANT CHANGE UP
PLATELET # BLD AUTO: 328 K/UL — SIGNIFICANT CHANGE UP (ref 150–400)
PMV BLD: 10.1 FL — SIGNIFICANT CHANGE UP (ref 7–13)
POTASSIUM SERPL-MCNC: 4.2 MMOL/L — SIGNIFICANT CHANGE UP (ref 3.5–5.3)
POTASSIUM SERPL-SCNC: 4.2 MMOL/L — SIGNIFICANT CHANGE UP (ref 3.5–5.3)
PROT SERPL-MCNC: 8.5 G/DL — HIGH (ref 6–8.3)
RBC # BLD: 4.41 M/UL — SIGNIFICANT CHANGE UP (ref 4.2–5.8)
RBC # FLD: 14.3 % — SIGNIFICANT CHANGE UP (ref 10.3–14.5)
SODIUM SERPL-SCNC: 137 MMOL/L — SIGNIFICANT CHANGE UP (ref 135–145)
WBC # BLD: 15.59 K/UL — HIGH (ref 3.8–10.5)
WBC # FLD AUTO: 15.59 K/UL — HIGH (ref 3.8–10.5)

## 2018-10-27 PROCEDURE — 99232 SBSQ HOSP IP/OBS MODERATE 35: CPT

## 2018-10-27 RX ADMIN — PYRAZINAMIDE 1500 MILLIGRAM(S): 500 TABLET ORAL at 14:08

## 2018-10-27 RX ADMIN — Medication 300 MILLIGRAM(S): at 14:06

## 2018-10-27 RX ADMIN — Medication 1 TABLET(S): at 14:07

## 2018-10-27 RX ADMIN — PANTOPRAZOLE SODIUM 40 MILLIGRAM(S): 20 TABLET, DELAYED RELEASE ORAL at 14:07

## 2018-10-27 RX ADMIN — Medication 50 MILLIGRAM(S): at 14:07

## 2018-10-27 RX ADMIN — ETHAMBUTOL HYDROCHLORIDE 1200 MILLIGRAM(S): 400 TABLET, FILM COATED ORAL at 14:06

## 2018-10-27 NOTE — PROGRESS NOTE ADULT - ASSESSMENT
28 M immigrated from [facility] age 12 found to have active multifocal cavitary TB initiated on RIPE therapy 10/12 with multiple positive sputums.

## 2018-10-27 NOTE — PROGRESS NOTE ADULT - PROBLEM SELECTOR PLAN 1
-continue with RIPE therapy, daily vitamin B6 50mg daily, isolation   - repeat sputum again on Monday 10/29, need 3 neg samples   - mild transaminitis has not progressed, no s/s of hepatitis   - patient's family will be tested by pediatrician/pmd.   ophtho consult- normal baseline eye exam (ethambutol tox)  - per infx control, need 3 neg sputums - JORGE following

## 2018-10-27 NOTE — PROGRESS NOTE ADULT - SUBJECTIVE AND OBJECTIVE BOX
Patient is a 28y old  Male who presents with a chief complaint of long term cough and concerning CXR findings, admitted for TB rule out (26 Oct 2018 17:04)      SUBJECTIVE / OVERNIGHT EVENTS: No acute events overnight. No complaints. Reports some clear sputum but no chest pain, SOB, N/V/D. Tolerating diet.    MEDICATIONS  (STANDING):  ethambutol 1200 milliGRAM(s) Oral daily  isoniazid 300 milliGRAM(s) Oral daily  multivitamin 1 Tablet(s) Oral daily  pantoprazole    Tablet 40 milliGRAM(s) Oral daily  pyrazinamide 1500 milliGRAM(s) Oral daily  pyridoxine 50 milliGRAM(s) Oral daily  rifampin 600 milliGRAM(s) Oral daily    MEDICATIONS  (PRN):  sodium chloride 3%  Inhalation 3 milliLiter(s) Inhalation every 8 hours PRN sputum induction      T(C): 37.4 (10-27-18 @ 12:34), Max: 37.4 (10-27-18 @ 12:34)  HR: 84 (10-27-18 @ 12:34) (84 - 93)  BP: 111/65 (10-27-18 @ 12:34) (111/65 - 120/75)  RR: 17 (10-27-18 @ 12:34) (16 - 17)  SpO2: 99% (10-27-18 @ 12:34) (99% - 100%)  CAPILLARY BLOOD GLUCOSE        I&O's Summary      PHYSICAL EXAM:  GENERAL: no apparent distress, on room air  EYES: sclera clear b/l  CHEST/LUNG: Clear to auscultation bilaterally; No wheezing or crackles  HEART: Regular rate and rhythm; No murmurs, rubs, or gallops  ABDOMEN: Soft, Nontender, Nondistended; Bowel sounds present  EXTREMITIES:  2+ Peripheral Pulses, No clubbing, cyanosis, or edema  MSK: no joint effusions  Back: no spinal tenderness  NEUROLOGY: awake, alert, responds to Qs appropriately, no gross deficits  PSYCH: calm, cooperative  SKIN: No rashes or lesions    LABS:                        11.0   15.59 )-----------( 328      ( 27 Oct 2018 07:14 )             33.9     10-27    137  |  100  |  14  ----------------------------<  89  4.2   |  24  |  0.74    Ca    9.3      27 Oct 2018 07:14    TPro  8.5<H>  /  Alb  3.7  /  TBili  0.3  /  DBili  x   /  AST  33  /  ALT  64<H>  /  AlkPhos  78  10-27              RADIOLOGY & ADDITIONAL TESTS:

## 2018-10-28 PROCEDURE — 99232 SBSQ HOSP IP/OBS MODERATE 35: CPT

## 2018-10-28 RX ADMIN — Medication 50 MILLIGRAM(S): at 13:32

## 2018-10-28 RX ADMIN — PYRAZINAMIDE 1500 MILLIGRAM(S): 500 TABLET ORAL at 13:32

## 2018-10-28 RX ADMIN — Medication 1 TABLET(S): at 13:32

## 2018-10-28 RX ADMIN — Medication 300 MILLIGRAM(S): at 13:32

## 2018-10-28 RX ADMIN — ETHAMBUTOL HYDROCHLORIDE 1200 MILLIGRAM(S): 400 TABLET, FILM COATED ORAL at 13:32

## 2018-10-28 RX ADMIN — PANTOPRAZOLE SODIUM 40 MILLIGRAM(S): 20 TABLET, DELAYED RELEASE ORAL at 13:32

## 2018-10-28 NOTE — PROGRESS NOTE ADULT - SUBJECTIVE AND OBJECTIVE BOX
Patient is a 28y old  Male who presents with a chief complaint of long term cough and concerning CXR findings, admitted for TB rule out (27 Oct 2018 14:58)      SUBJECTIVE / OVERNIGHT EVENTS: No acute events overnight. Still with cough and clear sputum. No chest pain, SOB. Reports it is very warm in his room.     MEDICATIONS  (STANDING):  ethambutol 1200 milliGRAM(s) Oral daily  isoniazid 300 milliGRAM(s) Oral daily  multivitamin 1 Tablet(s) Oral daily  pantoprazole    Tablet 40 milliGRAM(s) Oral daily  pyrazinamide 1500 milliGRAM(s) Oral daily  pyridoxine 50 milliGRAM(s) Oral daily  rifampin 600 milliGRAM(s) Oral daily    MEDICATIONS  (PRN):  sodium chloride 3%  Inhalation 3 milliLiter(s) Inhalation every 8 hours PRN sputum induction      T(C): 37.4 (10-28-18 @ 05:40), Max: 37.4 (10-27-18 @ 12:34)  HR: 72 (10-28-18 @ 05:40) (72 - 84)  BP: 107/72 (10-28-18 @ 05:40) (107/72 - 118/73)  RR: 18 (10-28-18 @ 05:40) (17 - 18)  SpO2: 100% (10-28-18 @ 05:40) (96% - 100%)  CAPILLARY BLOOD GLUCOSE        I&O's Summary      PHYSICAL EXAM:  GENERAL: no apparent distress, on room air  EYES: sclera clear b/l  CHEST/LUNG: Clear to auscultation bilaterally; No wheezing or crackles  HEART: Regular rate and rhythm; No murmurs, rubs, or gallops  ABDOMEN: Soft, Nontender, Nondistended; Bowel sounds present  EXTREMITIES:  2+ Peripheral Pulses, No clubbing, cyanosis, or edema  NEUROLOGY: awake, alert, responds to Qs appropriately, no gross deficits  PSYCH: calm, cooperative    LABS:                        11.0   15.59 )-----------( 328      ( 27 Oct 2018 07:14 )             33.9     10-27    137  |  100  |  14  ----------------------------<  89  4.2   |  24  |  0.74    Ca    9.3      27 Oct 2018 07:14    TPro  8.5<H>  /  Alb  3.7  /  TBili  0.3  /  DBili  x   /  AST  33  /  ALT  64<H>  /  AlkPhos  78  10-27              RADIOLOGY & ADDITIONAL TESTS:

## 2018-10-28 NOTE — PROGRESS NOTE ADULT - ASSESSMENT
28 M immigrated from [facility] age 12 found to have active multifocal cavitary TB initiated on RIPE therapy 10/12 with multiple positive sputums.

## 2018-10-29 LAB
ALBUMIN SERPL ELPH-MCNC: 3.6 G/DL — SIGNIFICANT CHANGE UP (ref 3.3–5)
ALP SERPL-CCNC: 75 U/L — SIGNIFICANT CHANGE UP (ref 40–120)
ALT FLD-CCNC: 53 U/L — HIGH (ref 4–41)
AST SERPL-CCNC: 28 U/L — SIGNIFICANT CHANGE UP (ref 4–40)
BASOPHILS # BLD AUTO: 0.08 K/UL — SIGNIFICANT CHANGE UP (ref 0–0.2)
BASOPHILS NFR BLD AUTO: 0.6 % — SIGNIFICANT CHANGE UP (ref 0–2)
BILIRUB SERPL-MCNC: 0.3 MG/DL — SIGNIFICANT CHANGE UP (ref 0.2–1.2)
BUN SERPL-MCNC: 13 MG/DL — SIGNIFICANT CHANGE UP (ref 7–23)
CALCIUM SERPL-MCNC: 9.2 MG/DL — SIGNIFICANT CHANGE UP (ref 8.4–10.5)
CHLORIDE SERPL-SCNC: 98 MMOL/L — SIGNIFICANT CHANGE UP (ref 98–107)
CO2 SERPL-SCNC: 26 MMOL/L — SIGNIFICANT CHANGE UP (ref 22–31)
CREAT SERPL-MCNC: 0.77 MG/DL — SIGNIFICANT CHANGE UP (ref 0.5–1.3)
EOSINOPHIL # BLD AUTO: 0.3 K/UL — SIGNIFICANT CHANGE UP (ref 0–0.5)
EOSINOPHIL NFR BLD AUTO: 2.2 % — SIGNIFICANT CHANGE UP (ref 0–6)
GLUCOSE SERPL-MCNC: 110 MG/DL — HIGH (ref 70–99)
HCT VFR BLD CALC: 35.6 % — LOW (ref 39–50)
HGB BLD-MCNC: 11.3 G/DL — LOW (ref 13–17)
IMM GRANULOCYTES # BLD AUTO: 0.07 # — SIGNIFICANT CHANGE UP
IMM GRANULOCYTES NFR BLD AUTO: 0.5 % — SIGNIFICANT CHANGE UP (ref 0–1.5)
LYMPHOCYTES # BLD AUTO: 2.96 K/UL — SIGNIFICANT CHANGE UP (ref 1–3.3)
LYMPHOCYTES # BLD AUTO: 22.1 % — SIGNIFICANT CHANGE UP (ref 13–44)
MCHC RBC-ENTMCNC: 25.2 PG — LOW (ref 27–34)
MCHC RBC-ENTMCNC: 31.7 % — LOW (ref 32–36)
MCV RBC AUTO: 79.5 FL — LOW (ref 80–100)
MONOCYTES # BLD AUTO: 0.96 K/UL — HIGH (ref 0–0.9)
MONOCYTES NFR BLD AUTO: 7.2 % — SIGNIFICANT CHANGE UP (ref 2–14)
NEUTROPHILS # BLD AUTO: 9.02 K/UL — HIGH (ref 1.8–7.4)
NEUTROPHILS NFR BLD AUTO: 67.4 % — SIGNIFICANT CHANGE UP (ref 43–77)
NRBC # FLD: 0 — SIGNIFICANT CHANGE UP
PLATELET # BLD AUTO: 306 K/UL — SIGNIFICANT CHANGE UP (ref 150–400)
PMV BLD: 10.3 FL — SIGNIFICANT CHANGE UP (ref 7–13)
POTASSIUM SERPL-MCNC: 4.4 MMOL/L — SIGNIFICANT CHANGE UP (ref 3.5–5.3)
POTASSIUM SERPL-SCNC: 4.4 MMOL/L — SIGNIFICANT CHANGE UP (ref 3.5–5.3)
PROT SERPL-MCNC: 8.5 G/DL — HIGH (ref 6–8.3)
RBC # BLD: 4.48 M/UL — SIGNIFICANT CHANGE UP (ref 4.2–5.8)
RBC # FLD: 14.3 % — SIGNIFICANT CHANGE UP (ref 10.3–14.5)
SODIUM SERPL-SCNC: 136 MMOL/L — SIGNIFICANT CHANGE UP (ref 135–145)
WBC # BLD: 13.39 K/UL — HIGH (ref 3.8–10.5)
WBC # FLD AUTO: 13.39 K/UL — HIGH (ref 3.8–10.5)

## 2018-10-29 PROCEDURE — 99232 SBSQ HOSP IP/OBS MODERATE 35: CPT

## 2018-10-29 RX ADMIN — Medication 50 MILLIGRAM(S): at 13:46

## 2018-10-29 RX ADMIN — ETHAMBUTOL HYDROCHLORIDE 1200 MILLIGRAM(S): 400 TABLET, FILM COATED ORAL at 13:46

## 2018-10-29 RX ADMIN — PYRAZINAMIDE 1500 MILLIGRAM(S): 500 TABLET ORAL at 13:47

## 2018-10-29 RX ADMIN — Medication 300 MILLIGRAM(S): at 13:46

## 2018-10-29 RX ADMIN — PANTOPRAZOLE SODIUM 40 MILLIGRAM(S): 20 TABLET, DELAYED RELEASE ORAL at 13:46

## 2018-10-29 RX ADMIN — Medication 1 TABLET(S): at 13:46

## 2018-10-29 NOTE — PROGRESS NOTE ADULT - SUBJECTIVE AND OBJECTIVE BOX
Patient is a 28y old  Male who presents with a chief complaint of long term cough and concerning CXR findings, admitted for TB rule out    SUBJECTIVE / OVERNIGHT EVENTS:    Minimal cough, no hemoptysis   No CP, SOB, f/c, n/v    MEDICATIONS  (STANDING):  ethambutol 1200 milliGRAM(s) Oral daily  isoniazid 300 milliGRAM(s) Oral daily  multivitamin 1 Tablet(s) Oral daily  pantoprazole    Tablet 40 milliGRAM(s) Oral daily  pyrazinamide 1500 milliGRAM(s) Oral daily  pyridoxine 50 milliGRAM(s) Oral daily  rifampin 600 milliGRAM(s) Oral daily    MEDICATIONS  (PRN):  sodium chloride 3%  Inhalation 3 milliLiter(s) Inhalation every 8 hours PRN sputum induction    T(C): 36.8 (10-29-18 @ 13:25), Max: 37.1 (10-28-18 @ 21:16)  HR: 75 (10-29-18 @ 13:25) (60 - 86)  BP: 108/68 (10-29-18 @ 13:25) (108/68 - 119/75)  RR: 17 (10-29-18 @ 13:25) (17 - 18)  SpO2: 98% (10-29-18 @ 13:25) (96% - 99%)    PHYSICAL EXAM:  GENERAL: NAD  HEAD:  Atraumatic, Normocephalic  EYES: EOMI, PERRLA, conjunctiva and sclera clear  NECK: Supple, No JVD  CHEST/LUNG: Clear to auscultation bilaterally; No wheeze  HEART: Regular rate and rhythm; No murmurs, rubs, or gallops  ABDOMEN: Soft, Nontender, Nondistended; Bowel sounds present  EXTREMITIES:   warm and well perfused, No clubbing, cyanosis, or edema  PSYCH: AAOx3  NEUROLOGY: non-focal  SKIN: No rashes or lesions    LABS:                        11.3   13.39 )-----------( 306      ( 29 Oct 2018 07:40 )             35.6     10-29    136  |  98  |  13  ----------------------------<  110<H>  4.4   |  26  |  0.77    Ca    9.2      29 Oct 2018 07:40    TPro  8.5<H>  /  Alb  3.6  /  TBili  0.3  /  DBili  x   /  AST  28  /  ALT  53<H>  /  AlkPhos  75  10-29    Notes Reviewed: ID  Care Discussed with Consultants/Other Providers:

## 2018-10-29 NOTE — PROGRESS NOTE ADULT - PROBLEM SELECTOR PLAN 1
- continue with RIPE therapy, daily vitamin B6 50mg daily (started 10/12-)  - airborne isolation   - repeat sputum today  - mild transaminitis has not progressed, no s/s of hepatitis   - patient's family will be tested by pediatrician/pmd.   - ophtho eval appreciated- normal baseline eye exam (ethambutol tox)  - per infx control, need 3 neg sputums - JORGE following

## 2018-10-30 LAB
CULTURE - ACID FAST SMEAR CONCENTRATED: SIGNIFICANT CHANGE UP
SPECIMEN SOURCE: SIGNIFICANT CHANGE UP

## 2018-10-30 PROCEDURE — 99232 SBSQ HOSP IP/OBS MODERATE 35: CPT

## 2018-10-30 RX ADMIN — PANTOPRAZOLE SODIUM 40 MILLIGRAM(S): 20 TABLET, DELAYED RELEASE ORAL at 14:24

## 2018-10-30 RX ADMIN — PYRAZINAMIDE 1500 MILLIGRAM(S): 500 TABLET ORAL at 14:26

## 2018-10-30 RX ADMIN — Medication 1 TABLET(S): at 14:25

## 2018-10-30 RX ADMIN — ETHAMBUTOL HYDROCHLORIDE 1200 MILLIGRAM(S): 400 TABLET, FILM COATED ORAL at 14:24

## 2018-10-30 RX ADMIN — Medication 50 MILLIGRAM(S): at 14:24

## 2018-10-30 RX ADMIN — Medication 300 MILLIGRAM(S): at 14:24

## 2018-10-30 NOTE — PROVIDER CONTACT NOTE (CRITICAL VALUE NOTIFICATION) - SITUATION
Patient admitted for rule out TB
critical result called to floor
pts sputum AFB came back positive from 10/25
sputum culture sent 10/13 & 10/14 AFB smear positive
sputum culture +AFB

## 2018-10-30 NOTE — PROVIDER CONTACT NOTE (CRITICAL VALUE NOTIFICATION) - BACKGROUND
Patient admitted for persistent dry cough for six months.
Patient admitted with persistent cough, and being treated for TB
adm dx: active TB
patient on airborne precautions for rule out TB
patient admitted for r/o TB

## 2018-10-30 NOTE — PROGRESS NOTE ADULT - ASSESSMENT
28 M immigrated from [facility] age 12 found to have active multifocal cavitary TB initiated on RIPE therapy 10/12 with continued positive sputum, awaits JORGE clearance.

## 2018-10-30 NOTE — PROGRESS NOTE ADULT - SUBJECTIVE AND OBJECTIVE BOX
Patient is a 28y old  Male who presents with a chief complaint of active TB    SUBJECTIVE / OVERNIGHT EVENTS:    States he is feeling well  cough on and off, sometimes dry others not  dealing ok with diagnosis, managing with his cell phone  no CP, SOB, f/c/n/v    MEDICATIONS  (STANDING):  ethambutol 1200 milliGRAM(s) Oral daily  isoniazid 300 milliGRAM(s) Oral daily  multivitamin 1 Tablet(s) Oral daily  pantoprazole    Tablet 40 milliGRAM(s) Oral daily  pyrazinamide 1500 milliGRAM(s) Oral daily  pyridoxine 50 milliGRAM(s) Oral daily  rifampin 600 milliGRAM(s) Oral daily    T(C): 36.8 (10-30-18 @ 07:00), Max: 37.1 (10-29-18 @ 21:03)  HR: 91 (10-30-18 @ 07:00) (75 - 91)  BP: 109/73 (10-30-18 @ 07:00) (107/65 - 109/73)  RR: 18 (10-30-18 @ 07:00) (17 - 18)  SpO2: 100% (10-30-18 @ 07:00) (98% - 100%)    PHYSICAL EXAM:  GENERAL: NAD, well-developed  HEAD:  Atraumatic, Normocephalic  EYES: EOMI, PERRLA, conjunctiva and sclera clear  NECK: Supple, No JVD  CHEST/LUNG: Clear to auscultation bilaterally; No wheeze  HEART: Regular rate and rhythm; No murmurs, rubs, or gallops  ABDOMEN: Soft, Nontender, Nondistended; Bowel sounds present  EXTREMITIES:   warm and well perfused, No clubbing, cyanosis, or edema  PSYCH: AAOx3  NEUROLOGY: non-focal  SKIN: No rashes or lesions    LABS: no labs today    AFB (10/30): pending result    Consultant(s) Notes Reviewed:    Care Discussed with Consultants/Other Providers:

## 2018-10-30 NOTE — PROVIDER CONTACT NOTE (CRITICAL VALUE NOTIFICATION) - ASSESSMENT
patient resting in the bed no s.s distress noted
Patient continues to have dry cough, sodium chloride inhalation given every eight hours for sputum induction.
assessment remains unchanged
pt stable at this time

## 2018-10-30 NOTE — PROGRESS NOTE ADULT - PROBLEM SELECTOR PLAN 2
Microcytic anemia (likely anemia of chronic disease based on iron studies)  - will monitor CBC 1-2x/week

## 2018-10-30 NOTE — PROVIDER CONTACT NOTE (CRITICAL VALUE NOTIFICATION) - RECOMMENDATIONS
NP will continue to monitor and order AFBs as tolerated
Notify provider, maintain isolation precautions as ordered.
continue airborne precautions
continue with medication

## 2018-10-30 NOTE — PROVIDER CONTACT NOTE (CRITICAL VALUE NOTIFICATION) - TEST AND RESULT REPORTED:
Mycobacterium Tuberculosis Complex PCR: Detected
Sputum AFB smear positve
Sputum smear positive
sputum
sputum culture +AFB

## 2018-10-30 NOTE — PROGRESS NOTE ADULT - PROBLEM SELECTOR PLAN 1
- continue with RIPE therapy, daily vitamin B6 50mg daily (started 10/12-)  - airborne isolation   - repeat sputum, in lab f/u  - mild transaminitis has not progressed, no s/s of hepatitis   - patient's family will be tested by pediatrician/pmd.   - ophtho eval appreciated- normal baseline eye exam (ethambutol tox)  - per infx control, need 3 neg sputums - JORGE following

## 2018-10-30 NOTE — PROVIDER CONTACT NOTE (CRITICAL VALUE NOTIFICATION) - PERSON GIVING RESULT:
Ana Ramirez
Brittni Gill
Microbiology; Ana Ramirez
walker Gill - [facility]
[place]

## 2018-10-31 PROCEDURE — 99232 SBSQ HOSP IP/OBS MODERATE 35: CPT

## 2018-10-31 RX ADMIN — PANTOPRAZOLE SODIUM 40 MILLIGRAM(S): 20 TABLET, DELAYED RELEASE ORAL at 13:05

## 2018-10-31 RX ADMIN — Medication 1 TABLET(S): at 13:05

## 2018-10-31 RX ADMIN — Medication 50 MILLIGRAM(S): at 13:06

## 2018-10-31 RX ADMIN — PYRAZINAMIDE 1500 MILLIGRAM(S): 500 TABLET ORAL at 13:06

## 2018-10-31 RX ADMIN — ETHAMBUTOL HYDROCHLORIDE 1200 MILLIGRAM(S): 400 TABLET, FILM COATED ORAL at 13:05

## 2018-10-31 RX ADMIN — Medication 300 MILLIGRAM(S): at 13:05

## 2018-10-31 NOTE — PROGRESS NOTE ADULT - SUBJECTIVE AND OBJECTIVE BOX
Patient is a 28y old  Male who presents with a chief complaint of Active TB    SUBJECTIVE / OVERNIGHT EVENTS:    No CP, SOB, f/c  no nausea/vomiting  reports taking meds     MEDICATIONS  (STANDING):  ethambutol 1200 milliGRAM(s) Oral daily  isoniazid 300 milliGRAM(s) Oral daily  multivitamin 1 Tablet(s) Oral daily  pantoprazole    Tablet 40 milliGRAM(s) Oral daily  pyrazinamide 1500 milliGRAM(s) Oral daily  pyridoxine 50 milliGRAM(s) Oral daily  rifampin 600 milliGRAM(s) Oral daily    MEDICATIONS  (PRN):  sodium chloride 3%  Inhalation 3 milliLiter(s) Inhalation every 8 hours PRN sputum induction    T(C): 37 (10-31-18 @ 12:44), Max: 37 (10-31-18 @ 12:44)  HR: 95 (10-31-18 @ 12:44) (86 - 100)  BP: 107/68 (10-31-18 @ 12:44) (107/68 - 120/78)  RR: 18 (10-31-18 @ 12:44) (18 - 18)  SpO2: 99% (10-31-18 @ 12:44) (97% - 100%)    PHYSICAL EXAM:  GENERAL: NAD, well-developed  HEAD:  Atraumatic, Normocephalic  CHEST/LUNG: Clear to auscultation bilaterally; No wheeze  HEART: Regular rate and rhythm; No murmurs, rubs, or gallops  ABDOMEN: Soft, Nontender, Nondistended; Bowel sounds present  EXTREMITIES:   warm and well perfused, No clubbing, cyanosis, or edema  PSYCH: AAOx3  NEUROLOGY: non-focal  SKIN: No rashes or lesions    LABS:  no new labs      Consultant(s) Notes Reviewed:    Care Discussed with Consultants/Other Providers:

## 2018-10-31 NOTE — PROGRESS NOTE ADULT - PROBLEM SELECTOR PLAN 1
- continue with RIPE therapy, daily vitamin B6 50mg daily (started 10/12-)  - airborne isolation   - will repeat AFB later this week  - mild transaminitis has not progressed, no s/s of hepatitis   - ophtho eval appreciated- normal baseline eye exam (ethambutol tox)  - per infx control, need 3 neg sputums - JORGE following

## 2018-10-31 NOTE — PROGRESS NOTE ADULT - PROBLEM SELECTOR PLAN 2
Microcytic anemia (likely anemia of chronic disease based on iron studies)  - will monitor CBC 1x per week

## 2018-10-31 NOTE — PROGRESS NOTE ADULT - ASSESSMENT
28 M immigrated from [facility] age 12 found to have active multifocal cavitary TB initiated on RIPE therapy 10/12 with continued positive sputum, awaits AFB clearance and JORGE clearance.

## 2018-11-01 PROCEDURE — 99232 SBSQ HOSP IP/OBS MODERATE 35: CPT

## 2018-11-01 RX ADMIN — Medication 300 MILLIGRAM(S): at 13:42

## 2018-11-01 RX ADMIN — Medication 50 MILLIGRAM(S): at 13:40

## 2018-11-01 RX ADMIN — PYRAZINAMIDE 1500 MILLIGRAM(S): 500 TABLET ORAL at 14:20

## 2018-11-01 RX ADMIN — ETHAMBUTOL HYDROCHLORIDE 1200 MILLIGRAM(S): 400 TABLET, FILM COATED ORAL at 13:40

## 2018-11-01 RX ADMIN — Medication 1 TABLET(S): at 13:40

## 2018-11-01 RX ADMIN — PANTOPRAZOLE SODIUM 40 MILLIGRAM(S): 20 TABLET, DELAYED RELEASE ORAL at 13:40

## 2018-11-01 NOTE — PROGRESS NOTE ADULT - SUBJECTIVE AND OBJECTIVE BOX
Patient is a 28y old  Male who presents with a chief complaint of active TB    SUBJECTIVE / OVERNIGHT EVENTS:    no new complaints  no CP, SOB, f/c/n/v    MEDICATIONS  (STANDING):  ethambutol 1200 milliGRAM(s) Oral daily  isoniazid 300 milliGRAM(s) Oral daily  multivitamin 1 Tablet(s) Oral daily  pantoprazole    Tablet 40 milliGRAM(s) Oral daily  pyrazinamide 1500 milliGRAM(s) Oral daily  pyridoxine 50 milliGRAM(s) Oral daily  rifampin 600 milliGRAM(s) Oral daily    MEDICATIONS  (PRN):  sodium chloride 3%  Inhalation 3 milliLiter(s) Inhalation every 8 hours PRN sputum induction    T(C): 36.8 (11-01-18 @ 05:57), Max: 37 (10-31-18 @ 12:44)  HR: 99 (11-01-18 @ 05:57) (95 - 99)  BP: 119/73 (11-01-18 @ 05:57) (107/68 - 119/73)  RR: 17 (11-01-18 @ 05:57) (17 - 18)  SpO2: 99% (11-01-18 @ 05:57) (99% - 99%)    PHYSICAL EXAM:  GENERAL: NAD, well-developed  HEAD:  Atraumatic, Normocephalic  EYES: EOMI, PERRLA, conjunctiva and sclera clear  NECK: Supple, No JVD  CHEST/LUNG: Clear to auscultation bilaterally; No wheeze  HEART: Regular rate and rhythm; No murmurs, rubs, or gallops  ABDOMEN: Soft, Nontender, Nondistended; Bowel sounds present  EXTREMITIES:   warm and well perfused, No clubbing, cyanosis, or edema  PSYCH: AAOx3  NEUROLOGY: non-focal  SKIN: No rashes or lesions    LABS:  no new labs    Consultant(s) Notes Reviewed:    Care Discussed with Consultants/Other Providers:

## 2018-11-01 NOTE — PROGRESS NOTE ADULT - PROBLEM SELECTOR PLAN 1
- continue with RIPE therapy, daily vitamin B6 50mg daily (started 10/12-)  - airborne isolation   - will repeat AFB tomorrow   - mild transaminitis has not progressed, no s/s of hepatitis   - ophtho eval appreciated- normal baseline eye exam (ethambutol tox)  - per infx control, need 3 neg sputums - JORGE following

## 2018-11-01 NOTE — PROGRESS NOTE ADULT - ASSESSMENT
29 yo M immigrated from [place] ([place] 12) diagnosed with active multifocal cavitary TB initiated on RIPE therapy 10/12 with continued positive sputum, awaits AFB clearance and JORGE clearance.

## 2018-11-02 PROCEDURE — 99232 SBSQ HOSP IP/OBS MODERATE 35: CPT

## 2018-11-02 RX ADMIN — ETHAMBUTOL HYDROCHLORIDE 1200 MILLIGRAM(S): 400 TABLET, FILM COATED ORAL at 13:26

## 2018-11-02 RX ADMIN — Medication 50 MILLIGRAM(S): at 13:26

## 2018-11-02 RX ADMIN — Medication 300 MILLIGRAM(S): at 13:27

## 2018-11-02 RX ADMIN — PYRAZINAMIDE 1500 MILLIGRAM(S): 500 TABLET ORAL at 13:36

## 2018-11-02 RX ADMIN — Medication 1 TABLET(S): at 13:26

## 2018-11-02 RX ADMIN — PANTOPRAZOLE SODIUM 40 MILLIGRAM(S): 20 TABLET, DELAYED RELEASE ORAL at 13:27

## 2018-11-02 NOTE — PROGRESS NOTE ADULT - SUBJECTIVE AND OBJECTIVE BOX
Follow Up:  cavitary TB    Interval History/ROS:   feels better.  occasional cough.  no fever, chills, abd pain, vision change.    Allergies  Advil (Swelling)  Motrin (Swelling)  Tylenol (Swelling)        ANTIMICROBIALS:  ethambutol 1200 daily  isoniazid 300 daily  pyrazinamide 1500 daily  rifampin 600 daily      OTHER MEDS:  MEDICATIONS  (STANDING):  pantoprazole    Tablet 40 daily  sodium chloride 3%  Inhalation 3 every 8 hours PRN      Vital Signs Last 24 Hrs  T(F): 98.2 (11-02-18 @ 12:21), Max: 98.2 (11-02-18 @ 07:19)  HR: 93 (11-02-18 @ 12:21)  BP: 106/65 (11-02-18 @ 12:21)  RR: 17 (11-02-18 @ 12:21)  SpO2: 99% (11-02-18 @ 12:21) (97% - 99%)    PHYSICAL EXAM:  General: WN/WD NAD, Non-toxic  lungs clear  COR  S1S2 no murmur  abd: soft nontender BS+  ext no rash                     MICROBIOLOGY:        SPUTUM  10-23-18 --  --  --  Culture - Acid Fast - Sputum w/Smear . (10.30.18 @ 00:33)    Culture - Acid Fast Smear Concentrated:   ***** CRITICAL RESULT *****  PERSON CALLED / READ-BACK: MIGUEL ANDRADE RN./Y  DATE / TIME CALLED: 10/30/18 1142  CALLED BY: TERRELL FLORES  AFB^Acid Fast Bacilli  QNTY AFB BY FLUOR. STAIN: (1+) RARE  QNTY AFB BY KINYOUN STAIN: (1+) RARE    Specimen Source: SPUTUM        SPUTUM  10-14-18 --  --  --      SPUTUM  10-13-18 --  --  --      SPUTUM  10-12-18 --  --  --      SPUTUM  10-12-18 --  --  --      SPUTUM  10-10-18 --  --  --      BLOOD  10-10-18 --  --  --      RADIOLOGY:    Jonathan Henao MD; Division of Infectious Disease; Pager: [phone]; nights and weekends: [phone]

## 2018-11-02 NOTE — PROGRESS NOTE ADULT - ASSESSMENT
Cavitary multifocal Tuberculosis  -isolate has tested negative for Rifampin resistance gene  On RIPE 10/12-->  clinically improving  Mild transaminitis continues to  improve  No clinical hepatitis  Tolerating TB therapy  leukocytosis    Continue RIPE  check sputum AFB today    Obdulia Carrillo MD  Pager: [phone]  After 5 PM or weekends please call fellow on call or office [phone]

## 2018-11-02 NOTE — PROGRESS NOTE ADULT - ASSESSMENT
29 yo M immigrated from [facility] ([place] to [place] 12) diagnosed with active multifocal cavitary TB initiated on RIPE therapy 10/12 with continued positive sputum, awaits AFB clearance and JORGE clearance.

## 2018-11-02 NOTE — PROGRESS NOTE ADULT - PROBLEM SELECTOR PLAN 1
- continue with RIPE therapy, daily vitamin B6 50mg daily (started 10/12-)  - airborne isolation   - will repeat AFB today  - mild transaminitis has not progressed, no s/s of hepatitis   - ophtho eval appreciated- normal baseline eye exam (ethambutol tox)  - per infx control, need 3 neg sputums - JORGE following

## 2018-11-02 NOTE — PROGRESS NOTE ADULT - SUBJECTIVE AND OBJECTIVE BOX
Patient is a 28y old  Male who presents with a chief complaint of active TB    SUBJECTIVE / OVERNIGHT EVENTS:    Feels well, slept ok   no CP, SOB, f/c  staying hydrated, no palpitations  voiding without issues     MEDICATIONS  (STANDING):  ethambutol 1200 milliGRAM(s) Oral daily  isoniazid 300 milliGRAM(s) Oral daily  multivitamin 1 Tablet(s) Oral daily  pantoprazole    Tablet 40 milliGRAM(s) Oral daily  pyrazinamide 1500 milliGRAM(s) Oral daily  pyridoxine 50 milliGRAM(s) Oral daily  rifampin 600 milliGRAM(s) Oral daily    MEDICATIONS  (PRN):  sodium chloride 3%  Inhalation 3 milliLiter(s) Inhalation every 8 hours PRN sputum induction    T(C): 36.8 (11-02-18 @ 12:21), Max: 37.2 (11-01-18 @ 13:45)  HR: 93 (11-02-18 @ 12:21) (73 - 93)  BP: 106/65 (11-02-18 @ 12:21) (105/72 - 108/74)  RR: 17 (11-02-18 @ 12:21) (17 - 17)  SpO2: 99% (11-02-18 @ 12:21) (97% - 100%)    PHYSICAL EXAM:  GENERAL: NAD, well-developed  HEAD:  Atraumatic, Normocephalic  EYES: EOMI, PERRLA, conjunctiva and sclera clear  NECK: Supple, No JVD  CHEST/LUNG: Clear to auscultation bilaterally; No wheeze  HEART: Regular rate and rhythm; No murmurs, rubs, or gallops  ABDOMEN: Soft, Nontender, Nondistended; Bowel sounds present  EXTREMITIES:   warm and well perfused, No clubbing, cyanosis, or edema  PSYCH: AAOx3  NEUROLOGY: non-focal  SKIN: No rashes or lesions    LABS:  no new labs    AFB pending collection today      Consultant(s) Notes Reviewed:    Care Discussed with Consultants/Other Providers:

## 2018-11-03 PROCEDURE — 99232 SBSQ HOSP IP/OBS MODERATE 35: CPT

## 2018-11-03 RX ADMIN — Medication 50 MILLIGRAM(S): at 13:52

## 2018-11-03 RX ADMIN — ETHAMBUTOL HYDROCHLORIDE 1200 MILLIGRAM(S): 400 TABLET, FILM COATED ORAL at 13:51

## 2018-11-03 RX ADMIN — Medication 1 TABLET(S): at 13:52

## 2018-11-03 RX ADMIN — PYRAZINAMIDE 1500 MILLIGRAM(S): 500 TABLET ORAL at 13:50

## 2018-11-03 RX ADMIN — PANTOPRAZOLE SODIUM 40 MILLIGRAM(S): 20 TABLET, DELAYED RELEASE ORAL at 13:52

## 2018-11-03 RX ADMIN — Medication 300 MILLIGRAM(S): at 13:52

## 2018-11-03 NOTE — PROGRESS NOTE ADULT - SUBJECTIVE AND OBJECTIVE BOX
Patient is a 28y old  Male who presents with a chief complaint of active TB    patient seen and examine at bed side   no acute issue       MEDICATIONS  (STANDING):  ethambutol 1200 milliGRAM(s) Oral daily  isoniazid 300 milliGRAM(s) Oral daily  multivitamin 1 Tablet(s) Oral daily  pantoprazole    Tablet 40 milliGRAM(s) Oral daily  pyrazinamide 1500 milliGRAM(s) Oral daily  pyridoxine 50 milliGRAM(s) Oral daily  rifampin 600 milliGRAM(s) Oral daily    MEDICATIONS  (PRN):  sodium chloride 3%  Inhalation 3 milliLiter(s) Inhalation every 8 hours PRN sputum induction      Vital Signs Last 24 Hrs  T(C): 36.9 (03 Nov 2018 06:35), Max: 37.2 (02 Nov 2018 23:04)  T(F): 98.5 (03 Nov 2018 06:35), Max: 98.9 (02 Nov 2018 23:04)  HR: 85 (03 Nov 2018 06:35) (85 - 87)  BP: 112/68 (03 Nov 2018 06:35) (104/63 - 112/68)  BP(mean): --  RR: 17 (03 Nov 2018 06:35) (17 - 18)  SpO2: 99% (03 Nov 2018 06:35) (99% - 99%)    PHYSICAL EXAM:  GENERAL: NAD, well-developed  HEAD:  Atraumatic, Normocephalic  EYES: EOMI, PERRLA, conjunctiva and sclera clear  NECK: Supple, No JVD  CHEST/LUNG: Clear to auscultation bilaterally; No wheeze  HEART: Regular rate and rhythm; No murmurs, rubs, or gallops  ABDOMEN: Soft, Nontender, Nondistended; Bowel sounds present  EXTREMITIES:   warm and well perfused, No clubbing, cyanosis, or edema  PSYCH: AAOx3  NEUROLOGY: non-focal  SKIN: No rashes or lesions    LABS:  no new labs          Consultant(s) Notes Reviewed:    Care Discussed with Consultants/Other Providers:

## 2018-11-03 NOTE — PROGRESS NOTE ADULT - PROBLEM SELECTOR PLAN 1
- continue with RIPE therapy, daily vitamin B6 50mg daily (started 10/12-)  - airborne isolation   AFB yesterday  - mild transaminitis has not progressed, no s/s of hepatitis   - ophtho eval appreciated- normal baseline eye exam (ethambutol tox)  - per infx control, need 3 neg sputums - JORGE following

## 2018-11-03 NOTE — PROGRESS NOTE ADULT - ASSESSMENT
29 yo M immigrated from [facility] ([place] to [place] 12) diagnosed with active multifocal cavitary TB initiated on RIPE therapy 10/12 with continued positive sputum, awaits AFB clearance and JORGE clearance.

## 2018-11-04 PROCEDURE — 99232 SBSQ HOSP IP/OBS MODERATE 35: CPT

## 2018-11-04 RX ADMIN — PANTOPRAZOLE SODIUM 40 MILLIGRAM(S): 20 TABLET, DELAYED RELEASE ORAL at 15:49

## 2018-11-04 RX ADMIN — PYRAZINAMIDE 1500 MILLIGRAM(S): 500 TABLET ORAL at 15:49

## 2018-11-04 RX ADMIN — ETHAMBUTOL HYDROCHLORIDE 1200 MILLIGRAM(S): 400 TABLET, FILM COATED ORAL at 15:48

## 2018-11-04 RX ADMIN — Medication 300 MILLIGRAM(S): at 15:49

## 2018-11-04 RX ADMIN — Medication 50 MILLIGRAM(S): at 15:49

## 2018-11-04 RX ADMIN — Medication 1 TABLET(S): at 15:49

## 2018-11-04 NOTE — PROGRESS NOTE ADULT - PROBLEM SELECTOR PLAN 1
- continue with RIPE therapy, daily vitamin B6 50mg daily (started 10/12-)  - airborne isolation   AFB from 11/3 ++  however AFB 11/2 - ve   - mild transaminitis has not progressed, no s/s of hepatitis   - ophtho eval appreciated- normal baseline eye exam (ethambutol tox)  - per infx control, need 3 neg sputums - JORGE following

## 2018-11-04 NOTE — PROGRESS NOTE ADULT - ASSESSMENT
29 yo M immigrated from [facility] (came to [place] 12) diagnosed with active multifocal cavitary TB initiated on RIPE therapy 10/12 with continued positive sputum, awaits AFB clearance and JORGE clearance.

## 2018-11-04 NOTE — PROGRESS NOTE ADULT - SUBJECTIVE AND OBJECTIVE BOX
Patient is a 28y old  Male who presents with a chief complaint of active TB    patient seen and examine at bed side   no acute issue   AFB from 11/3 ++  however AFB 11/2 - ve       MEDICATIONS  (STANDING):  ethambutol 1200 milliGRAM(s) Oral daily  isoniazid 300 milliGRAM(s) Oral daily  multivitamin 1 Tablet(s) Oral daily  pantoprazole    Tablet 40 milliGRAM(s) Oral daily  pyrazinamide 1500 milliGRAM(s) Oral daily  pyridoxine 50 milliGRAM(s) Oral daily  rifampin 600 milliGRAM(s) Oral daily    MEDICATIONS  (PRN):  sodium chloride 3%  Inhalation 3 milliLiter(s) Inhalation every 8 hours PRN sputum induction        Vital Signs Last 24 Hrs  T(C): 37.5 (04 Nov 2018 06:59), Max: 37.5 (04 Nov 2018 06:59)  T(F): 99.5 (04 Nov 2018 06:59), Max: 99.5 (04 Nov 2018 06:59)  HR: 75 (04 Nov 2018 06:59) (75 - 89)  BP: 104/62 (04 Nov 2018 06:59) (104/62 - 113/70)  BP(mean): --  RR: 17 (04 Nov 2018 06:59) (17 - 17)  SpO2: 98% (04 Nov 2018 06:59) (98% - 100%)    PHYSICAL EXAM:  GENERAL: NAD, well-developed  HEAD:  Atraumatic, Normocephalic  EYES: EOMI, PERRLA, conjunctiva and sclera clear  NECK: Supple, No JVD  CHEST/LUNG: Clear to auscultation bilaterally; No wheeze  HEART: Regular rate and rhythm; No murmurs, rubs, or gallops  ABDOMEN: Soft, Nontender, Nondistended; Bowel sounds present  EXTREMITIES:   warm and well perfused, No clubbing, cyanosis, or edema  PSYCH: AAOx3  NEUROLOGY: non-focal  SKIN: No rashes or lesions    LABS:  no new labs          Consultant(s) Notes Reviewed:    Care Discussed with Consultants/Other Providers:

## 2018-11-05 DIAGNOSIS — Z02.9 ENCOUNTER FOR ADMINISTRATIVE EXAMINATIONS, UNSPECIFIED: ICD-10-CM

## 2018-11-05 LAB
ALBUMIN SERPL ELPH-MCNC: 3.7 G/DL — SIGNIFICANT CHANGE UP (ref 3.3–5)
ALP SERPL-CCNC: 73 U/L — SIGNIFICANT CHANGE UP (ref 40–120)
ALT FLD-CCNC: 38 U/L — SIGNIFICANT CHANGE UP (ref 4–41)
AST SERPL-CCNC: 30 U/L — SIGNIFICANT CHANGE UP (ref 4–40)
BILIRUB SERPL-MCNC: 0.4 MG/DL — SIGNIFICANT CHANGE UP (ref 0.2–1.2)
BUN SERPL-MCNC: 11 MG/DL — SIGNIFICANT CHANGE UP (ref 7–23)
BUN SERPL-MCNC: 11 MG/DL — SIGNIFICANT CHANGE UP (ref 7–23)
CALCIUM SERPL-MCNC: 9.1 MG/DL — SIGNIFICANT CHANGE UP (ref 8.4–10.5)
CALCIUM SERPL-MCNC: 9.1 MG/DL — SIGNIFICANT CHANGE UP (ref 8.4–10.5)
CHLORIDE SERPL-SCNC: 100 MMOL/L — SIGNIFICANT CHANGE UP (ref 98–107)
CHLORIDE SERPL-SCNC: 100 MMOL/L — SIGNIFICANT CHANGE UP (ref 98–107)
CO2 SERPL-SCNC: 25 MMOL/L — SIGNIFICANT CHANGE UP (ref 22–31)
CO2 SERPL-SCNC: 25 MMOL/L — SIGNIFICANT CHANGE UP (ref 22–31)
CREAT SERPL-MCNC: 0.8 MG/DL — SIGNIFICANT CHANGE UP (ref 0.5–1.3)
CREAT SERPL-MCNC: 0.8 MG/DL — SIGNIFICANT CHANGE UP (ref 0.5–1.3)
CULTURE - ACID FAST SMEAR CONCENTRATED: SIGNIFICANT CHANGE UP
GLUCOSE SERPL-MCNC: 88 MG/DL — SIGNIFICANT CHANGE UP (ref 70–99)
GLUCOSE SERPL-MCNC: 88 MG/DL — SIGNIFICANT CHANGE UP (ref 70–99)
HCT VFR BLD CALC: 35.9 % — LOW (ref 39–50)
HGB BLD-MCNC: 11.5 G/DL — LOW (ref 13–17)
MCHC RBC-ENTMCNC: 25.2 PG — LOW (ref 27–34)
MCHC RBC-ENTMCNC: 32 % — SIGNIFICANT CHANGE UP (ref 32–36)
MCV RBC AUTO: 78.7 FL — LOW (ref 80–100)
NRBC # FLD: 0 — SIGNIFICANT CHANGE UP
PLATELET # BLD AUTO: 309 K/UL — SIGNIFICANT CHANGE UP (ref 150–400)
PMV BLD: 10.9 FL — SIGNIFICANT CHANGE UP (ref 7–13)
POTASSIUM SERPL-MCNC: 4.7 MMOL/L — SIGNIFICANT CHANGE UP (ref 3.5–5.3)
POTASSIUM SERPL-MCNC: 4.7 MMOL/L — SIGNIFICANT CHANGE UP (ref 3.5–5.3)
POTASSIUM SERPL-SCNC: 4.7 MMOL/L — SIGNIFICANT CHANGE UP (ref 3.5–5.3)
POTASSIUM SERPL-SCNC: 4.7 MMOL/L — SIGNIFICANT CHANGE UP (ref 3.5–5.3)
PROT SERPL-MCNC: 8.6 G/DL — HIGH (ref 6–8.3)
RBC # BLD: 4.56 M/UL — SIGNIFICANT CHANGE UP (ref 4.2–5.8)
RBC # FLD: 15.4 % — HIGH (ref 10.3–14.5)
SODIUM SERPL-SCNC: 138 MMOL/L — SIGNIFICANT CHANGE UP (ref 135–145)
SODIUM SERPL-SCNC: 138 MMOL/L — SIGNIFICANT CHANGE UP (ref 135–145)
SPECIMEN SOURCE: SIGNIFICANT CHANGE UP
WBC # BLD: 13.1 K/UL — HIGH (ref 3.8–10.5)
WBC # FLD AUTO: 13.1 K/UL — HIGH (ref 3.8–10.5)

## 2018-11-05 PROCEDURE — 99232 SBSQ HOSP IP/OBS MODERATE 35: CPT

## 2018-11-05 RX ADMIN — Medication 1 TABLET(S): at 13:35

## 2018-11-05 RX ADMIN — PANTOPRAZOLE SODIUM 40 MILLIGRAM(S): 20 TABLET, DELAYED RELEASE ORAL at 13:37

## 2018-11-05 RX ADMIN — Medication 300 MILLIGRAM(S): at 13:37

## 2018-11-05 RX ADMIN — ETHAMBUTOL HYDROCHLORIDE 1200 MILLIGRAM(S): 400 TABLET, FILM COATED ORAL at 13:36

## 2018-11-05 RX ADMIN — PYRAZINAMIDE 1500 MILLIGRAM(S): 500 TABLET ORAL at 13:37

## 2018-11-05 RX ADMIN — Medication 50 MILLIGRAM(S): at 13:37

## 2018-11-05 NOTE — PROGRESS NOTE ADULT - PROBLEM SELECTOR PLAN 4
DVT: SCD's   Diet: Regular  Dispo: pending clearance sputum AFB samples and JORGE clearance. DVT: SCD's   Diet: Regular

## 2018-11-05 NOTE — PROGRESS NOTE ADULT - PROBLEM SELECTOR PLAN 2
Microcytic anemia (likely anemia of chronic disease based on iron studies)  - will monitor CBC 1x per week Microcytic anemia - reviewed iron tests. Patient with transferrin saturation ~11.5% suggestive of component of iron deficiency in addition to anemia of chronic disease/inflammation. Hb/Hct stable.

## 2018-11-05 NOTE — PROGRESS NOTE ADULT - SUBJECTIVE AND OBJECTIVE BOX
Patient is a 28y old  Male who presents with a chief complaint of long term cough and concerning CXR findings, admitted for TB rule out (04 Nov 2018 12:01)        SUBJECTIVE / OVERNIGHT EVENTS:      MEDICATIONS  (STANDING):  ethambutol 1200 milliGRAM(s) Oral daily  isoniazid 300 milliGRAM(s) Oral daily  multivitamin 1 Tablet(s) Oral daily  pantoprazole    Tablet 40 milliGRAM(s) Oral daily  pyrazinamide 1500 milliGRAM(s) Oral daily  pyridoxine 50 milliGRAM(s) Oral daily  rifampin 600 milliGRAM(s) Oral daily    MEDICATIONS  (PRN):  sodium chloride 3%  Inhalation 3 milliLiter(s) Inhalation every 8 hours PRN sputum induction      Vital Signs Last 24 Hrs  T(C): 37 (05 Nov 2018 06:31), Max: 37 (05 Nov 2018 06:31)  T(F): 98.6 (05 Nov 2018 06:31), Max: 98.6 (05 Nov 2018 06:31)  HR: 92 (05 Nov 2018 06:31) (77 - 92)  BP: 116/72 (05 Nov 2018 06:31) (102/67 - 116/72)  BP(mean): --  RR: 17 (05 Nov 2018 06:31) (17 - 17)  SpO2: 100% (05 Nov 2018 06:31) (100% - 100%)  CAPILLARY BLOOD GLUCOSE        I&O's Summary        PHYSICAL EXAM  GENERAL: NAD, well-developed  HEAD:  Atraumatic, Normocephalic  EYES: EOMI, PERRLA, conjunctiva and sclera clear  NECK: Supple, No JVD  CHEST/LUNG: Clear to auscultation bilaterally; No wheeze  HEART: Regular rate and rhythm; No murmurs, rubs, or gallops  ABDOMEN: Soft, Nontender, Nondistended; Bowel sounds present  EXTREMITIES:  2+ Peripheral Pulses, No clubbing, cyanosis, or edema  PSYCH: AAOx3  SKIN: No rashes or lesions    LABS:                        11.5   13.10 )-----------( 309      ( 05 Nov 2018 05:44 )             35.9     11-05    138  |  100  |  11  ----------------------------<  88  4.7   |  25  |  0.80    Ca    9.1      05 Nov 2018 05:44    TPro  8.6<H>  /  Alb  3.7  /  TBili  0.4  /  DBili  x   /  AST  30  /  ALT  38  /  AlkPhos  73  11-05              RADIOLOGY & ADDITIONAL TESTS:    Imaging Personally Reviewed:  Consultant(s) Notes Reviewed:    Care Discussed with Consultants/Other Providers: Patient is a 28y old  Male who presents with a chief complaint of long term cough and concerning CXR findings, admitted for TB rule out (04 Nov 2018 12:01)    SUBJECTIVE / OVERNIGHT EVENTS:  Patient still with cough but without chest pain or SOB. Pt was able to provide sputum sample this morning. Patient eating and drinking well. No abdominal pain or nausea.    MEDICATIONS  (STANDING):  ethambutol 1200 milliGRAM(s) Oral daily  isoniazid 300 milliGRAM(s) Oral daily  multivitamin 1 Tablet(s) Oral daily  pantoprazole    Tablet 40 milliGRAM(s) Oral daily  pyrazinamide 1500 milliGRAM(s) Oral daily  pyridoxine 50 milliGRAM(s) Oral daily  rifampin 600 milliGRAM(s) Oral daily    MEDICATIONS  (PRN):  sodium chloride 3%  Inhalation 3 milliLiter(s) Inhalation every 8 hours PRN sputum induction      Vital Signs Last 24 Hrs  T(C): 37 (05 Nov 2018 06:31), Max: 37 (05 Nov 2018 06:31)  T(F): 98.6 (05 Nov 2018 06:31), Max: 98.6 (05 Nov 2018 06:31)  HR: 92 (05 Nov 2018 06:31) (77 - 92)  BP: 116/72 (05 Nov 2018 06:31) (102/67 - 116/72)  BP(mean): --  RR: 17 (05 Nov 2018 06:31) (17 - 17)  SpO2: 100% (05 Nov 2018 06:31) (100% - 100%)        PHYSICAL EXAM  GENERAL: Thin framed man in NAD  CHEST/LUNG: Clear to auscultation bilaterally; No wheeze  HEART: Regular rate and rhythm; No murmurs, rubs, or gallops  ABDOMEN: Soft, Nontender, Nondistended; Bowel sounds present  EXTREMITIES:  2+ Peripheral Pulses, No clubbing, cyanosis, or edema  PSYCH: AAOx3, cooperative      LABS:                        11.5   13.10 )-----------( 309      ( 05 Nov 2018 05:44 )             35.9     11-05    138  |  100  |  11  ----------------------------<  88  4.7   |  25  |  0.80    Ca    9.1      05 Nov 2018 05:44    TPro  8.6<H>  /  Alb  3.7  /  TBili  0.4  /  DBili  x   /  AST  30  /  ALT  38  /  AlkPhos  73  11-05

## 2018-11-05 NOTE — PROGRESS NOTE ADULT - PROBLEM SELECTOR PLAN 1
- continue with RIPE therapy, daily vitamin B6 50mg daily (started 10/12-)  - airborne isolation   AFB from 11/3 ++  however AFB 11/2 - ve   - mild transaminitis has not progressed, no s/s of hepatitis   - ophtho eval appreciated- normal baseline eye exam (ethambutol tox)  - per infx control, need 3 neg sputums - JORGE following Mild transaminitis has not progressed, no s/s of hepatitis. Ophtho eval done on 10/16- normal baseline eye exam (ethambutol tox). JORGE following case  10/30 AFB smear positive - 1(+)  11/2 AFB smear negative to date  11/5 AFB smear negative to date  - continue with RIPE therapy, daily vitamin B6 50mg daily (started 10/12-)  - airborne isolation   - pending 3rd sputum sample for AFB smear

## 2018-11-05 NOTE — PROGRESS NOTE ADULT - ASSESSMENT
29 yo M immigrated from [facility] ([place] to [place] 12) diagnosed with active multifocal cavitary TB initiated on RIPE therapy 10/12 with continued positive sputum, awaits AFB clearance and JORGE clearance. 29 yo M immigrated from [facility] ([place] 12) diagnosed with active multifocal cavitary TB initiated on RIPE therapy 10/12 with continued positive sputum, awaiting AFB clearance and JORGE clearance.

## 2018-11-06 PROCEDURE — 99232 SBSQ HOSP IP/OBS MODERATE 35: CPT

## 2018-11-06 RX ADMIN — PANTOPRAZOLE SODIUM 40 MILLIGRAM(S): 20 TABLET, DELAYED RELEASE ORAL at 13:40

## 2018-11-06 RX ADMIN — Medication 300 MILLIGRAM(S): at 13:41

## 2018-11-06 RX ADMIN — ETHAMBUTOL HYDROCHLORIDE 1200 MILLIGRAM(S): 400 TABLET, FILM COATED ORAL at 13:40

## 2018-11-06 RX ADMIN — Medication 50 MILLIGRAM(S): at 13:40

## 2018-11-06 RX ADMIN — PYRAZINAMIDE 1500 MILLIGRAM(S): 500 TABLET ORAL at 13:40

## 2018-11-06 RX ADMIN — Medication 1 TABLET(S): at 13:40

## 2018-11-06 NOTE — PROGRESS NOTE ADULT - PROBLEM SELECTOR PLAN 2
Microcytic anemia - reviewed iron tests. Patient with transferrin saturation ~11.5% suggestive of component of iron deficiency in addition to anemia of chronic disease/inflammation. Hb/Hct stable.

## 2018-11-06 NOTE — PROGRESS NOTE ADULT - ASSESSMENT
27 yo M immigrated from [facility] ([place] 12) diagnosed with active multifocal cavitary TB initiated on RIPE therapy 10/12 with continued positive sputum, awaiting AFB clearance and JORGE clearance.

## 2018-11-06 NOTE — PROGRESS NOTE ADULT - SUBJECTIVE AND OBJECTIVE BOX
Follow Up:  cavitary TB    Interval History/ROS:   no complaint feels better.  less cough.  no fever, chills, abd pain, vision change.    Allergies  Advil (Swelling)  Motrin (Swelling)  Tylenol (Swelling)        ANTIMICROBIALS:  ethambutol 1200 daily  isoniazid 300 daily  pyrazinamide 1500 daily  rifampin 600 daily      OTHER MEDS:  MEDICATIONS  (STANDING):  pantoprazole    Tablet 40 daily  sodium chloride 3%  Inhalation 3 every 8 hours PRN      Vital Signs Last 24 Hrs  T(F): 97.7 (11-06-18 @ 12:05), Max: 98.6 (11-05-18 @ 13:32)  HR: 94 (11-06-18 @ 12:05)  BP: 109/70 (11-06-18 @ 12:05)  RR: 17 (11-06-18 @ 12:05)  SpO2: 99% (11-06-18 @ 12:05) (99% - 100%)    PHYSICAL EXAM:  General: WN/WD NAD, Non-toxic  lungs clear  COR  S1S2 no murmur  abd: soft nontender BS+  ext no rash                     MICROBIOLOGY:        SPUTUM  Culture - Acid Fast - Sputum w/Smear . (11.05.18 @ 06:53)    Culture - Acid Fast Smear Concentrated:   AFB SMEAR= NO ACID FAST BACILLI SEEN    Specimen Source: SPUTUM    Culture - Acid Fast - Sputum w/Smear . (11.02.18 @ 20:38)    Culture - Acid Fast Smear Concentrated:   AFB SMEAR= NO ACID FAST BACILLI SEEN    Specimen Source: SPUTUM            SPUTUM  10-14-18 --  --  --      SPUTUM  10-13-18 --  --  --      SPUTUM  10-12-18 --  --  --      SPUTUM  10-12-18 --  --  --      SPUTUM  10-10-18 --  --  --      BLOOD  10-10-18 --  --  --      RADIOLOGY:    Jonathan Henao MD; Division of Infectious Disease; Pager: [phone]; nights and weekends: [phone]

## 2018-11-06 NOTE — PROGRESS NOTE ADULT - SUBJECTIVE AND OBJECTIVE BOX
Patient is a 28y old  Male who presents with a chief complaint of long term cough and concerning CXR findings, admitted for TB rule out (06 Nov 2018 12:22)        SUBJECTIVE / OVERNIGHT EVENTS:      MEDICATIONS  (STANDING):  ethambutol 1200 milliGRAM(s) Oral daily  isoniazid 300 milliGRAM(s) Oral daily  multivitamin 1 Tablet(s) Oral daily  pantoprazole    Tablet 40 milliGRAM(s) Oral daily  pyrazinamide 1500 milliGRAM(s) Oral daily  pyridoxine 50 milliGRAM(s) Oral daily  rifampin 600 milliGRAM(s) Oral daily    MEDICATIONS  (PRN):  sodium chloride 3%  Inhalation 3 milliLiter(s) Inhalation every 8 hours PRN sputum induction      Vital Signs Last 24 Hrs  T(C): 36.5 (06 Nov 2018 12:05), Max: 37 (06 Nov 2018 06:39)  T(F): 97.7 (06 Nov 2018 12:05), Max: 98.6 (06 Nov 2018 06:39)  HR: 94 (06 Nov 2018 12:05) (86 - 94)  BP: 109/70 (06 Nov 2018 12:05) (109/70 - 114/72)  BP(mean): --  RR: 17 (06 Nov 2018 12:05) (17 - 17)  SpO2: 99% (06 Nov 2018 12:05) (99% - 100%)  CAPILLARY BLOOD GLUCOSE        I&O's Summary        PHYSICAL EXAM  GENERAL: NAD, well-developed  HEAD:  Atraumatic, Normocephalic  EYES: EOMI, PERRLA, conjunctiva and sclera clear  NECK: Supple, No JVD  CHEST/LUNG: Clear to auscultation bilaterally; No wheeze  HEART: Regular rate and rhythm; No murmurs, rubs, or gallops  ABDOMEN: Soft, Nontender, Nondistended; Bowel sounds present  EXTREMITIES:  2+ Peripheral Pulses, No clubbing, cyanosis, or edema  PSYCH: AAOx3  SKIN: No rashes or lesions    LABS:                        11.5   13.10 )-----------( 309      ( 05 Nov 2018 05:44 )             35.9     11-05    138  |  100  |  11  ----------------------------<  88  4.7   |  25  |  0.80    Ca    9.1      05 Nov 2018 05:44    TPro  8.6<H>  /  Alb  3.7  /  TBili  0.4  /  DBili  x   /  AST  30  /  ALT  38  /  AlkPhos  73  11-05              RADIOLOGY & ADDITIONAL TESTS:    Imaging Personally Reviewed:  Consultant(s) Notes Reviewed:    Care Discussed with Consultants/Other Providers: Patient is a 28y old  Male who presents with a chief complaint of long term cough and concerning CXR findings, admitted for TB rule out (06 Nov 2018 12:22)    SUBJECTIVE / OVERNIGHT EVENTS:  Patient seen and examined this morning. No events overnight. Patient reports dry cough and unable to provide last sputum sample. Patient declining inducing sputum with hypertonic saline because he says it makes him vomit not produce sputum. Patient without fever or chills. Eating well without nausea or vomiting.     MEDICATIONS  (STANDING):  ethambutol 1200 milliGRAM(s) Oral daily  isoniazid 300 milliGRAM(s) Oral daily  multivitamin 1 Tablet(s) Oral daily  pantoprazole    Tablet 40 milliGRAM(s) Oral daily  pyrazinamide 1500 milliGRAM(s) Oral daily  pyridoxine 50 milliGRAM(s) Oral daily  rifampin 600 milliGRAM(s) Oral daily    MEDICATIONS  (PRN):  sodium chloride 3%  Inhalation 3 milliLiter(s) Inhalation every 8 hours PRN sputum induction      Vital Signs Last 24 Hrs  T(C): 36.5 (06 Nov 2018 12:05), Max: 37 (06 Nov 2018 06:39)  T(F): 97.7 (06 Nov 2018 12:05), Max: 98.6 (06 Nov 2018 06:39)  HR: 94 (06 Nov 2018 12:05) (86 - 94)  BP: 109/70 (06 Nov 2018 12:05) (109/70 - 114/72)  BP(mean): --  RR: 17 (06 Nov 2018 12:05) (17 - 17)  SpO2: 99% (06 Nov 2018 12:05) (99% - 100%)        I&O's Summary        PHYSICAL EXAM  GENERAL: Thin framed man in NAD  CHEST/LUNG: Clear to auscultation bilaterally with louder BS on the R compared to the left. No wheeze  HEART: Regular rate and rhythm; No murmurs, rubs, or gallops  ABDOMEN: Soft, Nontender, Nondistended; Bowel sounds present  EXTREMITIES:  2+ Peripheral Pulses, No clubbing, cyanosis, or edema  PSYCH: AAOx3, cooperative

## 2018-11-06 NOTE — PROGRESS NOTE ADULT - PROBLEM SELECTOR PLAN 1
Mild transaminitis has not progressed, no s/s of hepatitis. Ophtho eval done on 10/16- normal baseline eye exam (ethambutol tox). JORGE following case  10/30 AFB smear positive - 1(+)  11/2 AFB smear negative to date  11/5 AFB smear negative to date  - continue with RIPE therapy, daily vitamin B6 50mg daily (started 10/12-)  - airborne isolation   - pending 3rd sputum sample for AFB smear

## 2018-11-06 NOTE — PROGRESS NOTE ADULT - ASSESSMENT
Cavitary multifocal Tuberculosis  -isolate has tested negative for Rifampin resistance gene  On RIPE 10/12-->  clinically improving  Mild transaminitis resolved  Tolerating TB therapy.  AFB smear x  2 negative.  anticipate d/c soon.  patient does not know whether he has insurance coverage for TB medications.  leukocytosis    Suggest:    Continue RIPE  check sputum AFB today  social work evaluation       Obdulia Carrillo MD  Pager: [phone]  After 5 PM or weekends please call fellow on call or office [phone]

## 2018-11-07 PROCEDURE — 99232 SBSQ HOSP IP/OBS MODERATE 35: CPT

## 2018-11-07 RX ADMIN — Medication 1 TABLET(S): at 14:48

## 2018-11-07 RX ADMIN — Medication 300 MILLIGRAM(S): at 14:48

## 2018-11-07 RX ADMIN — Medication 50 MILLIGRAM(S): at 14:48

## 2018-11-07 RX ADMIN — PANTOPRAZOLE SODIUM 40 MILLIGRAM(S): 20 TABLET, DELAYED RELEASE ORAL at 14:48

## 2018-11-07 RX ADMIN — ETHAMBUTOL HYDROCHLORIDE 1200 MILLIGRAM(S): 400 TABLET, FILM COATED ORAL at 14:48

## 2018-11-07 RX ADMIN — PYRAZINAMIDE 1500 MILLIGRAM(S): 500 TABLET ORAL at 14:48

## 2018-11-07 NOTE — PROGRESS NOTE ADULT - ASSESSMENT
29 yo M immigrated from [facility] ([place] 12) diagnosed with active multifocal cavitary TB initiated on RIPE therapy 10/12 with continued positive sputum despite therapy suspected due to medication non-adherence. Patient now with negative AFB smears x3, now awaiting JORGE clearance.

## 2018-11-07 NOTE — PROGRESS NOTE ADULT - SUBJECTIVE AND OBJECTIVE BOX
Patient is a 28y old  Male who presents with a chief complaint of long term cough and concerning CXR findings, admitted for TB rule out (06 Nov 2018 18:48)    SUBJECTIVE / OVERNIGHT EVENTS:  No events overnight. Patient with dry cough without chest pain or SOB. Eating and drinking without nausea or abdominal pain.     MEDICATIONS  (STANDING):  ethambutol 1200 milliGRAM(s) Oral daily  isoniazid 300 milliGRAM(s) Oral daily  multivitamin 1 Tablet(s) Oral daily  pantoprazole    Tablet 40 milliGRAM(s) Oral daily  pyrazinamide 1500 milliGRAM(s) Oral daily  pyridoxine 50 milliGRAM(s) Oral daily  rifampin 600 milliGRAM(s) Oral daily    MEDICATIONS  (PRN):  sodium chloride 3%  Inhalation 3 milliLiter(s) Inhalation every 8 hours PRN sputum induction      Vital Signs Last 24 Hrs  T(C): 36.8 (07 Nov 2018 12:13), Max: 37.3 (07 Nov 2018 05:10)  T(F): 98.2 (07 Nov 2018 12:13), Max: 99.2 (07 Nov 2018 05:10)  HR: 67 (07 Nov 2018 12:13) (67 - 91)  BP: 130/80 (07 Nov 2018 12:13) (100/65 - 130/80)  BP(mean): --  RR: 17 (07 Nov 2018 12:13) (17 - 18)  SpO2: 97% (07 Nov 2018 12:13) (97% - 100%)        PHYSICAL EXAM  GENERAL: Thin framed man in NAD  CHEST/LUNG: Clear to auscultation bilaterally with louder BS on the R compared to the left. No wheeze  HEART: Regular rate and rhythm  ABDOMEN: Soft, Nontender, Nondistended  EXTREMITIES:  No clubbing, cyanosis, or edema, ROM intact  PSYCH: AAOx3, cooperative      LABS:    Culture - Acid Fast - Sputum w/Smear (collected 06 Nov 2018 20:04)  Source: SPUTUM  Final Report (07 Nov 2018 15:49):    AFB SMEAR= NO ACID FAST BACILLI SEEN    Culture - Acid Fast - Sputum w/Smear (collected 05 Nov 2018 06:53)  Source: SPUTUM  Final Report (05 Nov 2018 15:06):    AFB SMEAR= NO ACID FAST BACILLI SEEN      Consultant(s) Notes Reviewed:    Care Discussed with Consultants/Other Providers:

## 2018-11-07 NOTE — PROGRESS NOTE ADULT - PROBLEM SELECTOR PLAN 1
Ophtho eval done on 10/16- normal baseline eye exam (ethambutol tox). JORGE following case  10/30 AFB smear positive - 1(+)  11/2 AFB smear negative to date  11/5 AFB smear negative to date  11/6 AFB smear negative to date  - continue with RIPE therapy, daily vitamin B6 50mg daily (started 10/12-)  - d/c airborne isolation   - f/u with JORGE before pt can be discharged

## 2018-11-07 NOTE — CHART NOTE - NSCHARTNOTEFT_GEN_A_CORE
Source: Patient [x ]    Family [ ]     other [x ] RN    Pt seen for nutrition follow-up. Pt is a 27 y/o M with active TB pending 3rd sputum sample for AFB smear. Pt with good appetite and no GI distress.     Current Diet : regular  PO intake:  < 50% [ ] 50-75% [ ]   % [x ]  other :  Current Weight: 132 pounds ( no new wts)    __________________ Pertinent Medications__________________   MEDICATIONS  (STANDING):  ethambutol 1200 milliGRAM(s) Oral daily  isoniazid 300 milliGRAM(s) Oral daily  multivitamin 1 Tablet(s) Oral daily  pantoprazole    Tablet 40 milliGRAM(s) Oral daily  pyrazinamide 1500 milliGRAM(s) Oral daily  pyridoxine 50 milliGRAM(s) Oral daily  rifampin 600 milliGRAM(s) Oral daily    MEDICATIONS  (PRN):  sodium chloride 3%  Inhalation 3 milliLiter(s) Inhalation every 8 hours PRN sputum induction      __________________ Pertinent Labs__________________   11-05 Na138 mmol/L Glu 88 mg/dL K+ 4.7 mmol/L Cr  0.80 mg/dL BUN 11 mg/dL 11-05 Alb 3.7 g/dL        Skin: WDL    Estimated Needs:   [x ] no change since previous assessment  [ ] recalculated:       Previous Nutrition Diagnosis: Increased needs    [ ] Inadequate Energy Intake [ ]Inadequate Oral Intake [ ] Excessive Energy Intake   [ ] Underweight [ ] Increased Nutrient Needs [ ] Overweight/Obesity   [ ] Altered GI Function [ ] Unintended Weight Loss [ ] Food & Nutrition Related Knowledge Deficit [ ] Malnutrition     Nutrition Diagnosis is [x ] ongoing- with active TB    New Nutrition Diagnosis: [ x] not applicable      Interventions:     Recommend    1. Continue regular diet.     Monitoring and Evaluation:     [ x] PO intake [x ] Tolerance to diet prescription [x ] weights [ x] follow up per protocol  [ ] other:

## 2018-11-08 VITALS
HEART RATE: 76 BPM | OXYGEN SATURATION: 100 % | TEMPERATURE: 98 F | DIASTOLIC BLOOD PRESSURE: 66 MMHG | SYSTOLIC BLOOD PRESSURE: 107 MMHG | RESPIRATION RATE: 16 BRPM

## 2018-11-08 PROCEDURE — 99239 HOSP IP/OBS DSCHRG MGMT >30: CPT

## 2018-11-08 RX ORDER — PYRIDOXINE HCL (VITAMIN B6) 100 MG
1 TABLET ORAL
Qty: 21 | Refills: 0 | OUTPATIENT
Start: 2018-11-08 | End: 2018-11-28

## 2018-11-08 RX ORDER — PANTOPRAZOLE SODIUM 20 MG/1
1 TABLET, DELAYED RELEASE ORAL
Qty: 30 | Refills: 0 | OUTPATIENT
Start: 2018-11-08 | End: 2018-12-07

## 2018-11-08 RX ORDER — OMEPRAZOLE 10 MG/1
1 CAPSULE, DELAYED RELEASE ORAL
Qty: 0 | Refills: 0 | COMMUNITY

## 2018-11-08 RX ORDER — PYRAZINAMIDE 500 MG/1
3 TABLET ORAL
Qty: 63 | Refills: 0 | OUTPATIENT
Start: 2018-11-08 | End: 2018-11-28

## 2018-11-08 RX ORDER — HEXAVITAMINS
1 TABLET ORAL
Qty: 21 | Refills: 0 | OUTPATIENT
Start: 2018-11-08 | End: 2018-11-28

## 2018-11-08 RX ORDER — ETHAMBUTOL HYDROCHLORIDE 400 MG/1
3 TABLET, FILM COATED ORAL
Qty: 63 | Refills: 0 | OUTPATIENT
Start: 2018-11-08 | End: 2018-11-28

## 2018-11-08 RX ADMIN — Medication 300 MILLIGRAM(S): at 15:28

## 2018-11-08 RX ADMIN — PYRAZINAMIDE 1500 MILLIGRAM(S): 500 TABLET ORAL at 15:28

## 2018-11-08 RX ADMIN — Medication 50 MILLIGRAM(S): at 15:28

## 2018-11-08 RX ADMIN — PANTOPRAZOLE SODIUM 40 MILLIGRAM(S): 20 TABLET, DELAYED RELEASE ORAL at 15:28

## 2018-11-08 RX ADMIN — Medication 1 TABLET(S): at 15:28

## 2018-11-08 RX ADMIN — ETHAMBUTOL HYDROCHLORIDE 1200 MILLIGRAM(S): 400 TABLET, FILM COATED ORAL at 15:28

## 2018-11-08 NOTE — PROGRESS NOTE ADULT - PROBLEM SELECTOR PLAN 1
Ophtho eval done on 10/16- normal baseline eye exam (ethambutol tox). JORGE following case  10/30 AFB smear positive - 1(+)  11/2 AFB smear negative to date  11/5 AFB smear negative to date  11/6 AFB smear negative to date  - continue with RIPE therapy, daily vitamin B6 50mg daily (started 10/12-)

## 2018-11-08 NOTE — PROGRESS NOTE ADULT - SUBJECTIVE AND OBJECTIVE BOX
Patient is a 28y old  Male who presents with a chief complaint of Active TB (07 Nov 2018 16:40)    SUBJECTIVE / OVERNIGHT EVENTS:  Patient seen and examined this morning. No acute events. Patient has dry cough. He does not have SOB, fever or chest pain. Patient eating well and is ambulatory. No visual changes.    MEDICATIONS  (STANDING):  ethambutol 1200 milliGRAM(s) Oral daily  isoniazid 300 milliGRAM(s) Oral daily  multivitamin 1 Tablet(s) Oral daily  pantoprazole    Tablet 40 milliGRAM(s) Oral daily  pyrazinamide 1500 milliGRAM(s) Oral daily  pyridoxine 50 milliGRAM(s) Oral daily  rifampin 600 milliGRAM(s) Oral daily    MEDICATIONS  (PRN):  sodium chloride 3%  Inhalation 3 milliLiter(s) Inhalation every 8 hours PRN sputum induction      Vital Signs Last 24 Hrs  T(C): 36.7 (08 Nov 2018 12:24), Max: 36.8 (07 Nov 2018 21:13)  T(F): 98.1 (08 Nov 2018 12:24), Max: 98.3 (08 Nov 2018 07:00)  HR: 76 (08 Nov 2018 12:24) (76 - 86)  BP: 107/66 (08 Nov 2018 12:24) (107/66 - 115/64)  BP(mean): --  RR: 16 (08 Nov 2018 12:24) (16 - 16)  SpO2: 100% (08 Nov 2018 12:24) (99% - 100%)        PHYSICAL EXAM  GENERAL: Thin framed man in NAD  CHEST/LUNG: Clear to auscultation bilaterally. No wheeze  HEART: Regular rate and rhythm  ABDOMEN: Soft, Nontender, Nondistended  EXTREMITIES:  No clubbing, cyanosis, or edema, ROM intact  PSYCH: AAOx3, cooperative

## 2018-11-08 NOTE — PROGRESS NOTE ADULT - PROVIDER SPECIALTY LIST ADULT
Hospitalist
Infectious Disease
Internal Medicine
Hospitalist
Hospitalist
Internal Medicine

## 2018-11-08 NOTE — PROGRESS NOTE ADULT - PROBLEM SELECTOR PLAN 5
- C/w pantoprazole
DVT: SCD's   Diet: Regular      Sabra Bowman, PGY1  Pager [phone]
DVT: SCD's   Diet: Regular      Sabra Bowman, PGY1  Pager [phone]
Patient currently hospitalized for active TB treatment and monitoring. Patient now with negative AFB smears x3. JORGE has to notified of smear findings prior to discharge. Anticipate patient to be discharged home tomorrow with ID and JORGE following.  Pt with no needs for home service
Patient with negative AFB smears x3. Patient is medically stable and deemed appropriate for discharge today. [facility] has been notified of smear findings.  Pt with no needs for home service. Pt is ok to discharge to home with 3 week supply of TB meds.  [facility].  Spent 31 min coordinating discharge plan and counseling patient on his condition and the need to take his meds daily as prescribed without missing doses
Patient currently hospitalized for active TB treatment and monitoring. Patient with 2 negative AFB smears thus far, needs 3 in order to be discharged from hospital. Once deemed medically appropriate for discharge, patient will be discharged home with infectious disease and JORGE following. Pt with no needs for home service
Patient currently hospitalized for active TB treatment and monitoring. Patient with 2 negative AFB smears thus far, needs 3 in order to be discharged from hospital. Once deemed medically appropriate for discharge, patient will be discharged home with infectious disease and JORGE following. Pt with no needs for home service

## 2018-11-08 NOTE — PROGRESS NOTE ADULT - PROBLEM SELECTOR PROBLEM 3
GERD (gastroesophageal reflux disease)
Hypoalbuminemia
GERD (gastroesophageal reflux disease)

## 2018-11-08 NOTE — PROGRESS NOTE ADULT - PROBLEM SELECTOR PLAN 2
Microcytic anemia - reviewed iron tests. Patient with transferrin saturation ~11.5% suggestive of component of iron deficiency in addition to anemia of chronic disease/inflammation. Hb/Hct stable.  - can consider starting oral iron supplements as an outpatient

## 2018-11-08 NOTE — PROGRESS NOTE ADULT - PROBLEM SELECTOR PROBLEM 4
Preventative health care
Preventative health care
Hyponatremia
Preventative health care

## 2018-11-08 NOTE — PROGRESS NOTE ADULT - PROBLEM SELECTOR PROBLEM 5
Discharge planning issues
Discharge planning issues
GERD (gastroesophageal reflux disease)
Preventative health care
Preventative health care
Discharge planning issues
Discharge planning issues

## 2018-11-08 NOTE — PROGRESS NOTE ADULT - PROBLEM SELECTOR PROBLEM 2
Anemia

## 2018-11-08 NOTE — PROVIDER CONTACT NOTE (OTHER) - ACTION/TREATMENT ORDERED:
Please see discharge note for follow up appointment with [facility] TB clinic on 11/13/2018.

## 2018-11-08 NOTE — PROGRESS NOTE ADULT - ASSESSMENT
29 yo M immigrated from [facility] ([place] 12) diagnosed with active multifocal cavitary TB initiated on RIPE therapy 10/12 with continued positive sputum despite therapy suspected due to medication non-adherence. Patient now with negative AFB smears x3 and clearance from [facility] to be discharged from the hospital.

## 2018-11-08 NOTE — PROVIDER CONTACT NOTE (OTHER) - RECOMMENDATIONS
Pt is ok to discharge to home with 3 week supply of TB meds.  [facility] will do DOT on his treatment.

## 2018-11-19 LAB
ACID FAST STN SPT: SIGNIFICANT CHANGE UP

## 2018-11-20 PROBLEM — K21.9 GASTRO-ESOPHAGEAL REFLUX DISEASE WITHOUT ESOPHAGITIS: Chronic | Status: ACTIVE | Noted: 2018-10-10

## 2018-11-26 LAB — ACID FAST STN SPT: SIGNIFICANT CHANGE UP

## 2018-11-27 ENCOUNTER — APPOINTMENT (OUTPATIENT)
Dept: PULMONOLOGY | Facility: CLINIC | Age: 28
End: 2018-11-27
Payer: COMMERCIAL

## 2018-11-27 VITALS
SYSTOLIC BLOOD PRESSURE: 123 MMHG | HEART RATE: 81 BPM | RESPIRATION RATE: 16 BRPM | DIASTOLIC BLOOD PRESSURE: 76 MMHG | TEMPERATURE: 99.9 F

## 2018-11-27 DIAGNOSIS — R05 COUGH: ICD-10-CM

## 2018-11-27 PROCEDURE — 99203 OFFICE O/P NEW LOW 30 MIN: CPT

## 2018-11-27 RX ORDER — ISONIAZID 300 MG/1
TABLET ORAL
Refills: 0 | Status: ACTIVE | COMMUNITY

## 2018-11-27 RX ORDER — RIFAMPIN 300 MG/1
CAPSULE ORAL
Refills: 0 | Status: ACTIVE | COMMUNITY

## 2018-11-27 RX ORDER — ETHAMBUTOL HYDROCHLORIDE 400 MG/1
TABLET ORAL
Refills: 0 | Status: ACTIVE | COMMUNITY

## 2018-11-27 RX ORDER — PYRAZINAMIDE 500 MG/1
TABLET ORAL
Refills: 0 | Status: ACTIVE | COMMUNITY

## 2018-11-29 LAB — ACID FAST STN SPT: SIGNIFICANT CHANGE UP

## 2018-12-03 ENCOUNTER — APPOINTMENT (OUTPATIENT)
Dept: FAMILY MEDICINE | Facility: CLINIC | Age: 28
End: 2018-12-03
Payer: COMMERCIAL

## 2018-12-03 VITALS
SYSTOLIC BLOOD PRESSURE: 100 MMHG | DIASTOLIC BLOOD PRESSURE: 80 MMHG | BODY MASS INDEX: 18.01 KG/M2 | WEIGHT: 133 LBS | HEIGHT: 72 IN

## 2018-12-03 DIAGNOSIS — Z09 ENCOUNTER FOR FOLLOW-UP EXAMINATION AFTER COMPLETED TREATMENT FOR CONDITIONS OTHER THAN MALIGNANT NEOPLASM: ICD-10-CM

## 2018-12-03 LAB — ACID FAST STN SPT: SIGNIFICANT CHANGE UP

## 2018-12-03 PROCEDURE — 99496 TRANSJ CARE MGMT HIGH F2F 7D: CPT | Mod: Q5

## 2018-12-03 RX ORDER — PYRIDOXINE HCL (VITAMIN B6) 50 MG
50 TABLET ORAL
Refills: 0 | Status: ACTIVE | COMMUNITY
Start: 2018-12-03

## 2018-12-03 RX ORDER — PROMETHAZINE HYDROCHLORIDE AND DEXTROMETHORPHAN HYDROBROMIDE ORAL SOLUTION 15; 6.25 MG/5ML; MG/5ML
6.25-15 SOLUTION ORAL
Qty: 1 | Refills: 0 | Status: DISCONTINUED | COMMUNITY
Start: 2018-08-16 | End: 2018-12-03

## 2018-12-03 RX ORDER — PANTOPRAZOLE SODIUM 40 MG/10ML
40 INJECTION, POWDER, FOR SOLUTION INTRAVENOUS
Refills: 0 | Status: ACTIVE | COMMUNITY
Start: 2018-12-03

## 2018-12-03 RX ORDER — OMEPRAZOLE 40 MG/1
40 CAPSULE, DELAYED RELEASE ORAL TWICE DAILY
Qty: 30 | Refills: 0 | Status: DISCONTINUED | COMMUNITY
Start: 2018-09-17 | End: 2018-12-03

## 2018-12-03 NOTE — HISTORY OF PRESENT ILLNESS
[de-identified] : 28 year old male is here for a followup visit after being discharged from the hospital 6 days ago after 6 weeks of being admitted to the hospital for + active TB. Patient had three negative sputum, saw pulmonology outpatient.\par Patient is here for medication renewals and for blood work discussion. Medications and allergies were reviewed and assessed.  There has been no new medications since the last visit. Patient is feeling well with no active changes or issues since His last visit.\par

## 2018-12-03 NOTE — ASSESSMENT
[FreeTextEntry1] : spent time reviewing hospital course\par \par Patient was admitted for + TB\par doing better\par still tired\par \par will clear for work 1/2/2018\par \par continue on quadruple therapy for 9 months

## 2018-12-03 NOTE — HEALTH RISK ASSESSMENT
[] : No [No falls in past year] : Patient reported no falls in the past year [0] : 2) Feeling down, depressed, or hopeless: Not at all (0) [[id number]] : 0

## 2018-12-18 LAB — ACID FAST STN SPT: SIGNIFICANT CHANGE UP

## 2019-05-06 ENCOUNTER — APPOINTMENT (OUTPATIENT)
Dept: FAMILY MEDICINE | Facility: CLINIC | Age: 29
End: 2019-05-06

## 2019-05-24 ENCOUNTER — APPOINTMENT (OUTPATIENT)
Dept: FAMILY MEDICINE | Facility: CLINIC | Age: 29
End: 2019-05-24
Payer: COMMERCIAL

## 2019-05-24 VITALS
HEIGHT: 72 IN | HEART RATE: 67 BPM | BODY MASS INDEX: 20.05 KG/M2 | SYSTOLIC BLOOD PRESSURE: 112 MMHG | DIASTOLIC BLOOD PRESSURE: 74 MMHG | RESPIRATION RATE: 16 BRPM | WEIGHT: 148 LBS | OXYGEN SATURATION: 99 %

## 2019-05-24 DIAGNOSIS — A15.9 RESPIRATORY TUBERCULOSIS UNSPECIFIED: ICD-10-CM

## 2019-05-24 PROCEDURE — 36415 COLL VENOUS BLD VENIPUNCTURE: CPT

## 2019-05-24 PROCEDURE — 99214 OFFICE O/P EST MOD 30 MIN: CPT | Mod: 25

## 2019-05-28 NOTE — ASSESSMENT
[FreeTextEntry1] : TB\par Patient was admitted for + TB\par discharged 12/2019\par \par continue on quadruple therapy for 9 months\par works at [employer]\par \par Needs FMLA paperwork filled out for doctors appointments\par \par needs bw done

## 2019-05-28 NOTE — HEALTH RISK ASSESSMENT
[No falls in past year] : Patient reported no falls in the past year [0] : 2) Feeling down, depressed, or hopeless: Not at all (0) [] : No [[id number]] : 0

## 2020-03-27 NOTE — PROGRESS NOTE ADULT - PROBLEM SELECTOR PLAN 2
Microcytic anemia (likely anemia of chronic disease based on iron studies)  -will monitor CBC 1-2x/week normal...

## 2020-08-27 NOTE — PROGRESS NOTE ADULT - REASON FOR ADMISSION
long term cough and concerning CXR findings, admitted for TB rule out Complex Repair And Burow's Graft Text: The defect edges were debeveled with a #15 scalpel blade.  The primary defect was closed partially with a complex linear closure.  Given the location of the defect, shape of the defect, the proximity to free margins and the presence of a standing cone deformity a Burow's graft was deemed most appropriate to repair the remaining defect.  The graft was trimmed to fit the size of the remaining defect.  The graft was then placed in the primary defect, oriented appropriately, and sutured into place.

## 2022-05-03 NOTE — PROGRESS NOTE ADULT - ASSESSMENT
28 year old [place] male with PMHx of GERD presenting to ED after X-ray performed by GI for chronic cough demonstrated multifocal consolidations, with admission for rule out of TB. Xolair Counseling:  Patient informed of potential adverse effects including but not limited to fever, muscle aches, rash and allergic reactions.  The patient verbalized understanding of the proper use and possible adverse effects of Xolair.  All of the patient's questions and concerns were addressed.

## 2022-06-09 NOTE — PROGRESS NOTE ADULT - PROBLEM/PLAN-2
DISPLAY PLAN FREE TEXT
yes

## 2023-06-19 NOTE — ED ADULT NURSE NOTE - CHIEF COMPLAINT
Per Dr. Elizondo patients infusions have been canceled until follow up. Patient was called and made aware.   
The patient is a 28y Male complaining of

## 2023-10-25 ENCOUNTER — APPOINTMENT (OUTPATIENT)
Dept: RADIOLOGY | Facility: IMAGING CENTER | Age: 33
End: 2023-10-25
Payer: COMMERCIAL

## 2023-10-25 ENCOUNTER — RESULT REVIEW (OUTPATIENT)
Age: 33
End: 2023-10-25

## 2023-10-25 ENCOUNTER — OUTPATIENT (OUTPATIENT)
Dept: OUTPATIENT SERVICES | Facility: HOSPITAL | Age: 33
LOS: 1 days | End: 2023-10-25
Payer: COMMERCIAL

## 2023-10-25 DIAGNOSIS — R05.9 COUGH, UNSPECIFIED: ICD-10-CM

## 2023-10-25 PROCEDURE — 71046 X-RAY EXAM CHEST 2 VIEWS: CPT | Mod: 26

## 2023-10-25 PROCEDURE — 71046 X-RAY EXAM CHEST 2 VIEWS: CPT

## 2023-11-21 ENCOUNTER — APPOINTMENT (OUTPATIENT)
Dept: PULMONOLOGY | Facility: CLINIC | Age: 33
End: 2023-11-21
Payer: COMMERCIAL

## 2023-11-21 VITALS
WEIGHT: 145 LBS | SYSTOLIC BLOOD PRESSURE: 122 MMHG | HEART RATE: 69 BPM | HEIGHT: 72 IN | BODY MASS INDEX: 19.64 KG/M2 | OXYGEN SATURATION: 98 % | DIASTOLIC BLOOD PRESSURE: 86 MMHG

## 2023-11-21 DIAGNOSIS — R07.89 OTHER CHEST PAIN: ICD-10-CM

## 2023-11-21 PROCEDURE — 94010 BREATHING CAPACITY TEST: CPT

## 2023-11-21 PROCEDURE — 99203 OFFICE O/P NEW LOW 30 MIN: CPT | Mod: 25,GC

## 2023-11-21 PROCEDURE — 94729 DIFFUSING CAPACITY: CPT

## 2023-11-21 PROCEDURE — 94726 PLETHYSMOGRAPHY LUNG VOLUMES: CPT

## 2023-11-21 PROCEDURE — ZZZZZ: CPT

## 2024-05-06 NOTE — PROGRESS NOTE ADULT - ASSESSMENT
Pt given urinal.   28 M immigrated from [facility] age 12 found to have active multifocal cavitary TB initiated on RIPE therapy 10/12 with continued positive sputum, awaits JORGE clearance.

## 2025-05-08 ENCOUNTER — NON-APPOINTMENT (OUTPATIENT)
Age: 35
End: 2025-05-08

## 2025-05-08 ENCOUNTER — APPOINTMENT (OUTPATIENT)
Dept: INTERNAL MEDICINE | Facility: CLINIC | Age: 35
End: 2025-05-08
Payer: COMMERCIAL

## 2025-05-08 VITALS
HEART RATE: 68 BPM | HEIGHT: 72 IN | DIASTOLIC BLOOD PRESSURE: 70 MMHG | SYSTOLIC BLOOD PRESSURE: 104 MMHG | TEMPERATURE: 98 F | BODY MASS INDEX: 20.05 KG/M2 | RESPIRATION RATE: 17 BRPM | WEIGHT: 148 LBS | OXYGEN SATURATION: 98 %

## 2025-05-08 DIAGNOSIS — D64.9 ANEMIA, UNSPECIFIED: ICD-10-CM

## 2025-05-08 DIAGNOSIS — Z13.31 ENCOUNTER FOR SCREENING FOR DEPRESSION: ICD-10-CM

## 2025-05-08 DIAGNOSIS — Z78.9 OTHER SPECIFIED HEALTH STATUS: ICD-10-CM

## 2025-05-08 DIAGNOSIS — K92.1 MELENA: ICD-10-CM

## 2025-05-08 DIAGNOSIS — Z13.1 ENCOUNTER FOR SCREENING FOR DIABETES MELLITUS: ICD-10-CM

## 2025-05-08 DIAGNOSIS — Z71.89 OTHER SPECIFIED COUNSELING: ICD-10-CM

## 2025-05-08 DIAGNOSIS — Z00.00 ENCOUNTER FOR GENERAL ADULT MEDICAL EXAMINATION W/OUT ABNORMAL FINDINGS: ICD-10-CM

## 2025-05-08 DIAGNOSIS — Z13.6 ENCOUNTER FOR SCREENING FOR CARDIOVASCULAR DISORDERS: ICD-10-CM

## 2025-05-08 PROCEDURE — 99497 ADVNCD CARE PLAN 30 MIN: CPT

## 2025-05-08 PROCEDURE — 99385 PREV VISIT NEW AGE 18-39: CPT

## 2025-05-08 PROCEDURE — 93000 ELECTROCARDIOGRAM COMPLETE: CPT | Mod: 59

## 2025-05-08 PROCEDURE — 99214 OFFICE O/P EST MOD 30 MIN: CPT | Mod: 25

## 2025-05-08 PROCEDURE — 36415 COLL VENOUS BLD VENIPUNCTURE: CPT

## 2025-05-08 PROCEDURE — G0444 DEPRESSION SCREEN ANNUAL: CPT | Mod: 59

## 2025-05-10 LAB
25(OH)D3 SERPL-MCNC: 22 NG/ML
ALBUMIN SERPL ELPH-MCNC: 4.8 G/DL
ALP BLD-CCNC: 53 U/L
ALT SERPL-CCNC: 17 U/L
ANION GAP SERPL CALC-SCNC: 15 MMOL/L
APPEARANCE: CLEAR
AST SERPL-CCNC: 24 U/L
BASOPHILS # BLD AUTO: 0.04 K/UL
BASOPHILS NFR BLD AUTO: 0.6 %
BILIRUB SERPL-MCNC: 0.7 MG/DL
BILIRUBIN URINE: NEGATIVE
BLOOD URINE: NEGATIVE
BUN SERPL-MCNC: 13 MG/DL
CALCIUM SERPL-MCNC: 9.4 MG/DL
CHLORIDE SERPL-SCNC: 105 MMOL/L
CHOLEST SERPL-MCNC: 136 MG/DL
CO2 SERPL-SCNC: 22 MMOL/L
COLOR: YELLOW
CREAT SERPL-MCNC: 0.93 MG/DL
CREAT SPEC-SCNC: 88 MG/DL
EGFRCR SERPLBLD CKD-EPI 2021: 112 ML/MIN/1.73M2
EOSINOPHIL # BLD AUTO: 0.14 K/UL
EOSINOPHIL NFR BLD AUTO: 2 %
ESTIMATED AVERAGE GLUCOSE: 105 MG/DL
GGT SERPL-CCNC: 11 U/L
GLUCOSE QUALITATIVE U: NEGATIVE MG/DL
GLUCOSE SERPL-MCNC: 84 MG/DL
HBA1C MFR BLD HPLC: 5.3 %
HBV SURFACE AB SER QL: REACTIVE
HCT VFR BLD CALC: 47.2 %
HCV AB SER QL: NONREACTIVE
HCV S/CO RATIO: 0.13 S/CO
HDLC SERPL-MCNC: 58 MG/DL
HGB BLD-MCNC: 15.5 G/DL
IMM GRANULOCYTES NFR BLD AUTO: 0.1 %
KETONES URINE: NEGATIVE MG/DL
LDLC SERPL-MCNC: 66 MG/DL
LEUKOCYTE ESTERASE URINE: NEGATIVE
LYMPHOCYTES # BLD AUTO: 2.79 K/UL
LYMPHOCYTES NFR BLD AUTO: 39.2 %
MAN DIFF?: NORMAL
MCHC RBC-ENTMCNC: 28.9 PG
MCHC RBC-ENTMCNC: 32.8 G/DL
MCV RBC AUTO: 87.9 FL
MICROALBUMIN 24H UR DL<=1MG/L-MCNC: <1.2 MG/DL
MICROALBUMIN/CREAT 24H UR-RTO: NORMAL MG/G
MONOCYTES # BLD AUTO: 0.55 K/UL
MONOCYTES NFR BLD AUTO: 7.7 %
NEUTROPHILS # BLD AUTO: 3.59 K/UL
NEUTROPHILS NFR BLD AUTO: 50.4 %
NITRITE URINE: NEGATIVE
NONHDLC SERPL-MCNC: 79 MG/DL
PH URINE: 7.5
PLATELET # BLD AUTO: 206 K/UL
POTASSIUM SERPL-SCNC: 4.9 MMOL/L
PROT SERPL-MCNC: 7.6 G/DL
PROTEIN URINE: NEGATIVE MG/DL
RBC # BLD: 5.37 M/UL
RBC # FLD: 12.6 %
SODIUM SERPL-SCNC: 142 MMOL/L
SPECIFIC GRAVITY URINE: 1.01
TRIGL SERPL-MCNC: 59 MG/DL
TSH SERPL-ACNC: 1.79 UIU/ML
UROBILINOGEN URINE: 0.2 MG/DL
VIT B12 SERPL-MCNC: 651 PG/ML
WBC # FLD AUTO: 7.12 K/UL

## 2025-05-20 ENCOUNTER — TRANSCRIPTION ENCOUNTER (OUTPATIENT)
Age: 35
End: 2025-05-20

## 2025-05-22 ENCOUNTER — APPOINTMENT (OUTPATIENT)
Dept: GASTROENTEROLOGY | Facility: CLINIC | Age: 35
End: 2025-05-22

## 2025-05-22 VITALS
BODY MASS INDEX: 20.05 KG/M2 | HEIGHT: 72 IN | HEART RATE: 77 BPM | DIASTOLIC BLOOD PRESSURE: 91 MMHG | WEIGHT: 148 LBS | SYSTOLIC BLOOD PRESSURE: 124 MMHG

## 2025-05-22 DIAGNOSIS — D64.9 ANEMIA, UNSPECIFIED: ICD-10-CM

## 2025-05-22 DIAGNOSIS — K92.1 MELENA: ICD-10-CM

## 2025-05-22 DIAGNOSIS — K62.5 HEMORRHAGE OF ANUS AND RECTUM: ICD-10-CM

## 2025-05-22 DIAGNOSIS — K64.9 UNSPECIFIED HEMORRHOIDS: ICD-10-CM

## 2025-05-22 PROCEDURE — G2211 COMPLEX E/M VISIT ADD ON: CPT | Mod: NC

## 2025-05-22 PROCEDURE — 99204 OFFICE O/P NEW MOD 45 MIN: CPT

## 2025-05-22 RX ORDER — HYDROCORTISONE ACETATE 25 MG/1
25 SUPPOSITORY RECTAL
Qty: 24 | Refills: 3 | Status: ACTIVE | COMMUNITY
Start: 2025-05-22 | End: 1900-01-01

## 2025-05-23 PROBLEM — K62.5 BRBPR (BRIGHT RED BLOOD PER RECTUM): Status: ACTIVE | Noted: 2025-05-23

## 2025-05-23 PROBLEM — K92.1 HEMATOCHEZIA: Noted: 2025-05-08
